# Patient Record
Sex: FEMALE | Race: OTHER | HISPANIC OR LATINO | Employment: FULL TIME | URBAN - METROPOLITAN AREA
[De-identification: names, ages, dates, MRNs, and addresses within clinical notes are randomized per-mention and may not be internally consistent; named-entity substitution may affect disease eponyms.]

---

## 2020-07-20 ENCOUNTER — TELEPHONE (OUTPATIENT)
Dept: FAMILY MEDICINE CLINIC | Facility: CLINIC | Age: 45
End: 2020-07-20

## 2020-07-20 DIAGNOSIS — E03.9 HYPOTHYROIDISM, UNSPECIFIED TYPE: Primary | ICD-10-CM

## 2020-07-20 RX ORDER — LEVOTHYROXINE SODIUM 0.03 MG/1
25 TABLET ORAL DAILY
Qty: 90 TABLET | Refills: 1 | Status: SHIPPED | OUTPATIENT
Start: 2020-07-20 | End: 2020-10-23 | Stop reason: SDUPTHER

## 2020-07-20 RX ORDER — LEVOTHYROXINE SODIUM 0.03 MG/1
25 TABLET ORAL DAILY
COMMUNITY
End: 2020-07-20 | Stop reason: SDUPTHER

## 2020-07-20 NOTE — TELEPHONE ENCOUNTER
Med refill levothyroxine 25 mcg tablet Take 1 tablet every day by oral route  [facility] [address]  [facility]

## 2020-10-20 ENCOUNTER — TELEPHONE (OUTPATIENT)
Dept: FAMILY MEDICINE CLINIC | Facility: CLINIC | Age: 45
End: 2020-10-20

## 2020-10-23 ENCOUNTER — OFFICE VISIT (OUTPATIENT)
Dept: FAMILY MEDICINE CLINIC | Facility: CLINIC | Age: 45
End: 2020-10-23
Payer: COMMERCIAL

## 2020-10-23 VITALS
DIASTOLIC BLOOD PRESSURE: 82 MMHG | HEIGHT: 62 IN | TEMPERATURE: 97.3 F | BODY MASS INDEX: 28.89 KG/M2 | WEIGHT: 157 LBS | SYSTOLIC BLOOD PRESSURE: 126 MMHG | HEART RATE: 84 BPM | OXYGEN SATURATION: 98 %

## 2020-10-23 DIAGNOSIS — E03.9 ACQUIRED HYPOTHYROIDISM: ICD-10-CM

## 2020-10-23 DIAGNOSIS — E03.9 HYPOTHYROIDISM, UNSPECIFIED TYPE: ICD-10-CM

## 2020-10-23 DIAGNOSIS — Z12.31 ENCOUNTER FOR SCREENING MAMMOGRAM FOR MALIGNANT NEOPLASM OF BREAST: Primary | ICD-10-CM

## 2020-10-23 PROCEDURE — 3725F SCREEN DEPRESSION PERFORMED: CPT | Performed by: FAMILY MEDICINE

## 2020-10-23 PROCEDURE — 99213 OFFICE O/P EST LOW 20 MIN: CPT | Performed by: FAMILY MEDICINE

## 2020-10-23 PROCEDURE — 1036F TOBACCO NON-USER: CPT | Performed by: FAMILY MEDICINE

## 2020-10-23 RX ORDER — LEVOTHYROXINE SODIUM 0.03 MG/1
25 TABLET ORAL DAILY
Qty: 90 TABLET | Refills: 1 | Status: SHIPPED | OUTPATIENT
Start: 2020-10-23 | End: 2021-01-11 | Stop reason: SDUPTHER

## 2020-10-27 ENCOUNTER — LAB (OUTPATIENT)
Dept: LAB | Facility: HOSPITAL | Age: 45
End: 2020-10-27
Attending: FAMILY MEDICINE
Payer: COMMERCIAL

## 2020-10-27 DIAGNOSIS — E03.9 HYPOTHYROIDISM, UNSPECIFIED TYPE: ICD-10-CM

## 2020-10-27 LAB — TSH SERPL DL<=0.05 MIU/L-ACNC: 2.65 UIU/ML (ref 0.34–5.6)

## 2020-10-27 PROCEDURE — 84443 ASSAY THYROID STIM HORMONE: CPT

## 2020-10-27 PROCEDURE — 36415 COLL VENOUS BLD VENIPUNCTURE: CPT

## 2020-12-10 ENCOUNTER — TRANSCRIBE ORDERS (OUTPATIENT)
Dept: LAB | Facility: CLINIC | Age: 45
End: 2020-12-10

## 2021-01-11 DIAGNOSIS — E03.9 HYPOTHYROIDISM, UNSPECIFIED TYPE: ICD-10-CM

## 2021-01-11 RX ORDER — LEVOTHYROXINE SODIUM 0.03 MG/1
25 TABLET ORAL DAILY
Qty: 90 TABLET | Refills: 0 | Status: SHIPPED | OUTPATIENT
Start: 2021-01-11 | End: 2021-04-07 | Stop reason: SDUPTHER

## 2021-03-31 DIAGNOSIS — Z23 ENCOUNTER FOR IMMUNIZATION: ICD-10-CM

## 2021-04-07 DIAGNOSIS — E03.9 HYPOTHYROIDISM, UNSPECIFIED TYPE: ICD-10-CM

## 2021-04-07 RX ORDER — LEVOTHYROXINE SODIUM 0.03 MG/1
25 TABLET ORAL DAILY
Qty: 90 TABLET | Refills: 0 | Status: SHIPPED | OUTPATIENT
Start: 2021-04-07 | End: 2021-07-01 | Stop reason: SDUPTHER

## 2021-05-07 ENCOUNTER — IMMUNIZATIONS (OUTPATIENT)
Dept: FAMILY MEDICINE CLINIC | Facility: HOSPITAL | Age: 46
End: 2021-05-07

## 2021-05-07 DIAGNOSIS — Z23 ENCOUNTER FOR IMMUNIZATION: Primary | ICD-10-CM

## 2021-05-07 PROCEDURE — 91301 SARS-COV-2 / COVID-19 MRNA VACCINE (MODERNA) 100 MCG: CPT

## 2021-05-07 PROCEDURE — 0011A SARS-COV-2 / COVID-19 MRNA VACCINE (MODERNA) 100 MCG: CPT

## 2021-06-02 ENCOUNTER — IMMUNIZATIONS (OUTPATIENT)
Dept: FAMILY MEDICINE CLINIC | Facility: HOSPITAL | Age: 46
End: 2021-06-02

## 2021-06-02 DIAGNOSIS — Z23 ENCOUNTER FOR IMMUNIZATION: Primary | ICD-10-CM

## 2021-06-02 PROCEDURE — 91301 SARS-COV-2 / COVID-19 MRNA VACCINE (MODERNA) 100 MCG: CPT

## 2021-06-02 PROCEDURE — 0012A SARS-COV-2 / COVID-19 MRNA VACCINE (MODERNA) 100 MCG: CPT

## 2021-06-28 ENCOUNTER — OFFICE VISIT (OUTPATIENT)
Dept: DERMATOLOGY | Facility: CLINIC | Age: 46
End: 2021-06-28

## 2021-06-28 VITALS — HEIGHT: 63 IN | BODY MASS INDEX: 28.53 KG/M2 | TEMPERATURE: 97.8 F | WEIGHT: 161 LBS

## 2021-06-28 DIAGNOSIS — R21 RASH: Primary | ICD-10-CM

## 2021-06-28 DIAGNOSIS — Z84.89 FAMILY HISTORY OF ATOPY: ICD-10-CM

## 2021-06-28 PROCEDURE — NC001 PR NO CHARGE: Performed by: DERMATOLOGY

## 2021-06-28 RX ORDER — CETIRIZINE HYDROCHLORIDE 10 MG/1
10 TABLET ORAL DAILY
COMMUNITY

## 2021-06-28 NOTE — PROGRESS NOTES
[facility]     Patient Name: Darian Dominguez  Encounter Date: 6/28/2021     Have you been cared for by a [facility] Dermatologist in the last 3 years and, if so, which one? No    · Have you traveled outside of the [address] [place] [address] in the past 3 months or outside of the [facility] area in the last 2 weeks? No     May we call your Preferred Phone number to discuss your specific medical information? Yes     May we leave a detailed message that includes your specific medical information? Yes      Today's Chief Concerns:   Concern #1:  Itchy rash on chest, neck, posterior ears, chin      Past Medical History:  Have you personally ever had or currently have any of the following? · Skin cancer (such as Melanoma, Basal Cell Carcinoma, Squamous Cell Carcinoma? (If Yes, please provide more detail)- No  · Eczema: YES  · Psoriasis: No  · HIV/AIDS: No  · Hepatitis B or C: No  · Tuberculosis: No  · Systemic Immunosuppression such as Diabetes, Biologic or Immunotherapy, Chemotherapy, Organ Transplantation, Bone Marrow Transplantation (If YES, please provide more detail): No  · Radiation Treatment (If YES, please provide more detail): No  · Any other major medical conditions/concerns? 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 Watch TV, reading, and play with son     Family History:  Have any of your "first degree relatives" (parent, brother, sister, or child) had any of the following       · Skin cancer such as Melanoma or Merkel Cell Carcinoma or Pancreatic Cancer? No  · Eczema, Asthma, Hay Fever or Seasonal Allergies: YES, Son has hx of asthma   · Psoriasis or Psoriatic Arthritis: YES, Sister has hx of psoriasis   · Do any other medical conditions seem to run in your family? If Yes, what condition and which relatives?   YES, Diabetes with mother and siblings, Thyroid with siblings and mother     Current Medications:         Current Outpatient Medications:     levothyroxine 25 mcg tablet, Take 1 tablet (25 mcg total) by mouth daily, Disp: 90 tablet, Rfl: 0      Review of Systems:  Have you recently had or currently have any of the following? If YES, what are you doing for the problem? · Fever, chills or unintended weight loss: YES, Chills   · Sudden loss or change in your vision: No  · Nausea, vomiting or blood in your stool: No  · Painful or swollen joints: No  · Wheezing or cough: No  · Changing mole or non-healing wound: No  · Nosebleeds: No  · Excessive sweating: No  · Easy or prolonged bleeding? No  · Over the last 2 weeks, how often have you been bothered by the following problems? · Taking little interest or pleasure in doing things: 1 - Not at All  · Feeling down, depressed, or hopeless: 1 - Not at All  · Rapid heartbeat with epinephrine:  No    · FEMALES ONLY:    · Are you pregnant or planning to become pregnant? No  · Are you currently or planning to be nursing or breast feeding? No    · Any known allergies? Allergies   Allergen Reactions    Pollen Extract Dermatitis, Headache, Itching and Nasal Congestion   ·       Physical Exam:     Was a chaperone (Derm Clinical Assistant) present throughout the entire Physical Exam? Yes     Did the Dermatology Team specifically  the patient on the importance of a Full Skin Exam to be sure that nothing is missed clinically?  Yes}  o Did the patient ultimately request or accept a Full Skin Exam?  NO      CONSTITUTIONAL:   Vitals:    06/28/21 1037   Temp: 97 8 °F (36 6 °C)   TempSrc: Tympanic   Weight: 73 kg (161 lb)   Height: 5' 3" (1 6 m)         PSYCH: Normal mood and affect  EYES: Normal conjunctiva  ENT: Normal lips and oral mucosa  CARDIOVASCULAR: No edema  RESPIRATORY: Normal respirations  HEME/LYMPH/IMMUNO:  No regional lymphadenopathy except as noted below in "ASSESSMENT AND PLAN BY DIAGNOSIS"    SKIN:  FULL ORGAN SYSTEM EXAM    Face Normal except as noted below in Assessment   Neck Normal except as noted below in Assessment       Assessment and Plan by Diagnosis:    History of Present Condition:     Duration:  How long has this been an issue for you?    o  Rash on chest, neck and back of ears about 5 days ago  o Spot on chin area - about 1 1/2 year ago   Location Affected:  Where on the body is this affecting you?    o  Chest, neck, back of ears, chin    Quality:  Is there any bleeding, pain, itch, burning/irritation, or redness associated with the skin lesion? o  Redness, itch   Severity:  Describe any bleeding, pain, itch, burning/irritation, or redness on a scale of 1 to 10 (with 10 being the worst)  o  8   Timing:  Does this condition seem to be there pretty constantly or do you notice it more at specific times throughout the day?    o  Intermittent    Context:  Have you ever noticed that this condition seems to be associated with specific activities you do?    o  Unknown   Modifying Factors:    o Anything that seems to make the condition worse?    -  Unknown   o What have you tried to do to make the condition better? - She tried to exfoliate the area a year ago and it got worse  Pimecrolimus 1% cream - Chin area- used it for 3 days, without improvement  - She has tried Vanicream moisturizer   - Tried Eucerin Urea repair cream  -  Tried over the counter ( Bought in [employer]) Zatural handcrafterf NEEMZ cleanser bar       Associated Signs and Symptoms:  Does this skin lesion seem to be associated with any of the following:  o  SL AMB DERM SIGNS AND SYMPTOMS: Itching and Scratching     1  RASH (Likely hypersensitivity reaction)    Physical Exam:   Anatomic Location Affected:  Chest, right ear   Morphological Description:  Faint erythema   Pertinent Positives: Patient reports the locations are itchy   Pertinent Negatives:     Additional History of Present Condition:  Patient complains of a red, itchy rash specially at night  She says this rash started about 5 days ago with an unknown cause  She has history of metal allergies diagnosed by her [address]   She takes Zyrtec everyday  She uses Pimecrolimus 1% cream as needed for the rash  She feels the Pimecrolimus cream calms the itch  She tried over the counter Vanicream and Eucerin Urea cream      Assessment and Plan:  Based on a thorough discussion of this condition and the management approach to it (including a comprehensive discussion of the known risks, side effects and potential benefits of treatment), the patient (family) agrees to implement the following specific plan:   Recommended to take a picture when she flares up ( red and itchy)  Call the office and schedule an appointment as soon the rash starts to get active   Continue taking Zyrtec 10 mg daily   Recommended to use Pimecrolimus 1% cream to chest and right ear twice a day to calm the itch  Discussed this cream is safe to use   We will check next visit to see how this is progressing  2  RASH (Morpheaform)  Physical Exam:   Anatomic Location Affected:  Left chin and left jawline   Morphological Description:  Stellate, reticulated erythema with slight atrophy   Pertinent Positives:   Pertinent Negatives: No history of topical steroid use to the area    Additional History of Present Condition:  Patient reports that she has never applied topical steroid to the area   Has only used pimecrolimus ointment and eucerin cream    Assessment and Plan:  Based on a thorough discussion of this condition and the management approach to it (including a comprehensive discussion of the known risks, side effects and potential benefits of treatment), the patient (family) agrees to implement the following specific plan:   Plan is to biopsy the lesion to confirm diagnosis   Discontinue pimecrolimus ointment prior to biopsy to optimize results   Can continue using Eucerin cream to the area in the meantime    Scribe Attestation    I,:  Malini Oliveira am acting as a scribe while in the presence of the attending physician :       I,:  Garrick Francois MD personally performed the services described in this documentation    as scribed in my presence :

## 2021-07-07 ENCOUNTER — OFFICE VISIT (OUTPATIENT)
Dept: DERMATOLOGY | Age: 46
End: 2021-07-07
Payer: COMMERCIAL

## 2021-07-07 VITALS — TEMPERATURE: 99.1 F | HEIGHT: 63 IN | WEIGHT: 162 LBS | BODY MASS INDEX: 28.7 KG/M2

## 2021-07-07 DIAGNOSIS — D48.5 NEOPLASM OF UNCERTAIN BEHAVIOR OF SKIN: ICD-10-CM

## 2021-07-07 DIAGNOSIS — R21 RASH: Primary | ICD-10-CM

## 2021-07-07 PROCEDURE — 88312 SPECIAL STAINS GROUP 1: CPT | Performed by: PATHOLOGY

## 2021-07-07 PROCEDURE — 11104 PUNCH BX SKIN SINGLE LESION: CPT | Performed by: DERMATOLOGY

## 2021-07-07 PROCEDURE — 88305 TISSUE EXAM BY PATHOLOGIST: CPT | Performed by: PATHOLOGY

## 2021-07-07 PROCEDURE — 99213 OFFICE O/P EST LOW 20 MIN: CPT | Performed by: DERMATOLOGY

## 2021-07-07 PROCEDURE — 88313 SPECIAL STAINS GROUP 2: CPT | Performed by: PATHOLOGY

## 2021-07-07 NOTE — PATIENT INSTRUCTIONS
RASH (Likely hypersensitivity reaction)    PROCEDURE NOTE:  PUNCH BIOPSY      Performing Physician: Dr Ybarra     Indications: To indicate diagnosis and management plan  Complications:  None      Specimen has been sent for review by Dermatopathology  Plan:  1  Instructed to keep the wound dry and covered for 24-48h and clean thereafter  2  Warning signs of infection were reviewed  3  Recommended that the patient use acetaminophen as needed for pain        Standard post-procedure care has been explained and has been included in written form within the patient's copy of Informed Consent

## 2021-07-07 NOTE — PROGRESS NOTES
[facility] Follow Up Note    Patient Name: Norma Gonzalez  Encounter Date: 07/07/2021    Today's Chief Concerns:  Elen Fritz Concern #1:  Follow up for rash       Current Medications:    Current Outpatient Medications:     cetirizine (ZyrTEC) 10 mg tablet, Take 10 mg by mouth daily, Disp: , Rfl:     levothyroxine 25 mcg tablet, Take 1 tablet (25 mcg total) by mouth daily, Disp: 90 tablet, Rfl: 0    CONSTITUTIONAL:   Vitals:    07/07/21 1235   Temp: 99 1 °F (37 3 °C)   TempSrc: Tympanic   Weight: 73 5 kg (162 lb)   Height: 5' 3" (1 6 m)     Specific Alerts:    Have you been seen by a [facility] Dermatologist in the last 3 years? YES    Are you pregnant or planning to become pregnant? No    Are you currently or planning to be nursing or breast feeding? No    Allergies   Allergen Reactions    Pollen Extract Dermatitis, Headache, Itching and Nasal Congestion       May we call your Preferred Phone number to discuss your specific medical information? YES    May we leave a detailed message that includes your specific medical information? YES    Have you traveled outside of the [facility] in the past 3 months? No    Do you currently have a pacemaker or defibrillator? No    Do you have any artificial heart valves, joints, plates, screws, rods, stents, pins, etc? No   - If Yes, were any placed within the last 2 years? Do you require any medications prior to a surgical procedure? No     Are you taking any medications that cause you to bleed more easily ("blood thinners") No    Have you ever experienced a rapid heartbeat with epinephrine? No    Have you ever been treated with "gold" (gold sodium thiomalate) therapy? No    [facility] can help with wrinkles, "laugh lines," facial volume loss, "double chin," "love handles," age spots, and more  Are you interested in learning today about some of the skin enhancement procedures that we offer?  (If Yes, please provide more detail) No    Review of Systems:  Have you recently had or currently have any of the following? · Fever or chills: No  · Night Sweats: No  · Headaches: No  · Weight Gain: No  · Weight Loss: No  · Blurry Vision: No  · Nausea: No  · Vomiting: No  · Diarrhea: No  · Blood in Stool: No  · Abdominal Pain: No  · Itchy Skin: YES  · Painful Joints: No  · Swollen Joints: No  · Muscle Pain: No  · Irregular Mole: No  · Sun Burn: YES  · Dry Skin: No  · Skin Color Changes: No  · Scar or Keloid: No  · Cold Sores/Fever Blisters: No  · Bacterial Infections/MRSA: No  · Anxiety: No  · Depression: No  · Suicidal or Homicidal Thoughts: No      PSYCH: Normal mood and affect  EYES: Normal conjunctiva  ENT: Normal lips and oral mucosa  CARDIOVASCULAR: No edema  RESPIRATORY: Normal respirations  HEME/LYMPH/IMMUNO:  No regional lymphadenopathy except as noted below in ASSESSMENT AND PLAN BY DIAGNOSIS    FULL ORGAN SYSTEM SKIN EXAM (SKIN)   Hair, Scalp, Ears, Face Normal except as noted below in Assessment   Neck, Cervical Chain Nodes Normal except as noted below in Assessment   Right Arm/Hand/Fingers Normal except as noted below in Assessment   Left Arm/Hand/Fingers Normal except as noted below in Assessment   Chest/Breasts/Axillae Viewed areas Normal except as noted below in Assessment   Abdomen, Umbilicus Normal except as noted below in Assessment   Back/Spine Normal except as noted below in Assessment   Groin/Genitalia/Buttocks Viewed areas Normal except as noted below in Assessment   Right Leg, Foot, Toes Normal except as noted below in Assessment   Left Leg, Foot, Toes Normal except as noted below in Assessment          RASH (Likely hypersensitivity reaction)     Physical Exam:  · Anatomic Location Affected:  Chest, right ear  · Morphological Description:  Faint erythema  · Pertinent Positives: Patient reports the locations are itchy  · Pertinent Negatives:     Additional History of Present Condition:  Patient reports that the rash is gone but still has the sensation of itching  Patient is currently using Zyrtec and Pimecrolimus cream       Assessment and Plan:  Based on a thorough discussion of this condition and the management approach to it (including a comprehensive discussion of the known risks, side effects and potential benefits of treatment), the patient (family) agrees to implement the following specific plan:  ·  Continue the Pimecrolimus cream as needed for itching         2  RASH (Morpheaform)    Physical Exam:  · Anatomic Location Affected:  Left chin and left jawline  · Morphological Description:  Stellate, reticulated erythema with slight atrophy  · Pertinent Positives:  · Pertinent Negatives: No history of topical steroid use to the area     Additional History of Present Condition:  Patient reports no improvement in rash, started as a small area and spread  Denies use of steroids on area     Assessment and Plan:  Based on a thorough discussion of this condition and the management approach to it (including a comprehensive discussion of the known risks, side effects and potential benefits of treatment), the patient (family) agrees to implement the following specific plan:  · Biopsy taken today in office and will call with results       PROCEDURE NOTE:  PUNCH BIOPSY      Performing Physician: Dr Ybarra    Anatomic Location; Clinical Description with size (cm); Pre-Op Diagnosis:     Right jaw line; 6 cm; atrophic pink plaque with reticulate purple macule; differential diagnosis; morphea rule out lipodystrophy unlikely steroid atrophy       Anesthesia: 1% xylocaine with epi       Topical anesthesia: None       Indications: To indicate diagnosis and management plan  Procedure Details     Patient informed of the risks (including bleeding,scaring and infection) and benefits of the procedure explained  Verbal and written informed consent obtained  The area was prepped and draped in the usual fashion   Anesthesia was obtained with 1% lidocaine with epinephrine  The skin was then stretched perpendicular to the skin tension lines and a punch biopsy to an appropriate sampling depth was obtained with a 4 mm punch with a forceps and iris scissors  Hemostasis was obtained with 5-0 Vicryl  x 2 sutures  Complications:  None      Specimen has been sent for review by Dermatopathology  Plan:  1  Instructed to keep the wound dry and covered for 24-48h and clean thereafter  2  Warning signs of infection were reviewed  3  Recommended that the patient use acetaminophen as needed for pain        Standard post-procedure care has been explained and has been included in written form within the patient's copy of Informed Consent            Scribe Attestation    I,:  Reyes Miser am acting as a scribe while in the presence of the attending physician :       I,:  Joyce Little MD personally performed the services described in this documentation    as scribed in my presence :

## 2021-08-05 DIAGNOSIS — M35.9 CONNECTIVE TISSUE DISEASE (HCC): Primary | ICD-10-CM

## 2021-08-11 ENCOUNTER — APPOINTMENT (OUTPATIENT)
Dept: LAB | Facility: CLINIC | Age: 46
End: 2021-08-11
Payer: COMMERCIAL

## 2021-08-11 DIAGNOSIS — M35.9 CONNECTIVE TISSUE DISEASE (HCC): ICD-10-CM

## 2021-08-11 LAB
ALBUMIN SERPL BCP-MCNC: 3.8 G/DL (ref 3.5–5)
ALP SERPL-CCNC: 96 U/L (ref 46–116)
ALT SERPL W P-5'-P-CCNC: 29 U/L (ref 12–78)
ANION GAP SERPL CALCULATED.3IONS-SCNC: 4 MMOL/L (ref 4–13)
AST SERPL W P-5'-P-CCNC: 27 U/L (ref 5–45)
BASOPHILS # BLD AUTO: 0.07 THOUSANDS/ΜL (ref 0–0.1)
BASOPHILS NFR BLD AUTO: 1 % (ref 0–1)
BILIRUB SERPL-MCNC: 0.57 MG/DL (ref 0.2–1)
BILIRUB UR QL STRIP: NEGATIVE
BUN SERPL-MCNC: 11 MG/DL (ref 5–25)
CALCIUM SERPL-MCNC: 9.1 MG/DL (ref 8.3–10.1)
CHLORIDE SERPL-SCNC: 107 MMOL/L (ref 100–108)
CLARITY UR: CLEAR
CO2 SERPL-SCNC: 25 MMOL/L (ref 21–32)
COLOR UR: YELLOW
CREAT SERPL-MCNC: 0.88 MG/DL (ref 0.6–1.3)
EOSINOPHIL # BLD AUTO: 0.11 THOUSAND/ΜL (ref 0–0.61)
EOSINOPHIL NFR BLD AUTO: 2 % (ref 0–6)
ERYTHROCYTE [DISTWIDTH] IN BLOOD BY AUTOMATED COUNT: 15.3 % (ref 11.6–15.1)
GFR SERPL CREATININE-BSD FRML MDRD: 78 ML/MIN/1.73SQ M
GLUCOSE P FAST SERPL-MCNC: 93 MG/DL (ref 65–99)
GLUCOSE UR STRIP-MCNC: NEGATIVE MG/DL
HCT VFR BLD AUTO: 46.3 % (ref 34.8–46.1)
HGB BLD-MCNC: 14.6 G/DL (ref 11.5–15.4)
HGB UR QL STRIP.AUTO: NEGATIVE
IMM GRANULOCYTES # BLD AUTO: 0.01 THOUSAND/UL (ref 0–0.2)
IMM GRANULOCYTES NFR BLD AUTO: 0 % (ref 0–2)
KETONES UR STRIP-MCNC: NEGATIVE MG/DL
LEUKOCYTE ESTERASE UR QL STRIP: NEGATIVE
LYMPHOCYTES # BLD AUTO: 2.63 THOUSANDS/ΜL (ref 0.6–4.47)
LYMPHOCYTES NFR BLD AUTO: 39 % (ref 14–44)
MCH RBC QN AUTO: 26.7 PG (ref 26.8–34.3)
MCHC RBC AUTO-ENTMCNC: 31.5 G/DL (ref 31.4–37.4)
MCV RBC AUTO: 85 FL (ref 82–98)
MONOCYTES # BLD AUTO: 0.6 THOUSAND/ΜL (ref 0.17–1.22)
MONOCYTES NFR BLD AUTO: 9 % (ref 4–12)
NEUTROPHILS # BLD AUTO: 3.42 THOUSANDS/ΜL (ref 1.85–7.62)
NEUTS SEG NFR BLD AUTO: 49 % (ref 43–75)
NITRITE UR QL STRIP: NEGATIVE
NRBC BLD AUTO-RTO: 0 /100 WBCS
PH UR STRIP.AUTO: 6 [PH]
PLATELET # BLD AUTO: 228 THOUSANDS/UL (ref 149–390)
PMV BLD AUTO: 12.3 FL (ref 8.9–12.7)
POTASSIUM SERPL-SCNC: 3.7 MMOL/L (ref 3.5–5.3)
PROT SERPL-MCNC: 8.5 G/DL (ref 6.4–8.2)
PROT UR STRIP-MCNC: NEGATIVE MG/DL
RBC # BLD AUTO: 5.46 MILLION/UL (ref 3.81–5.12)
SODIUM SERPL-SCNC: 136 MMOL/L (ref 136–145)
SP GR UR STRIP.AUTO: 1.02 (ref 1–1.03)
UROBILINOGEN UR QL STRIP.AUTO: 0.2 E.U./DL
WBC # BLD AUTO: 6.84 THOUSAND/UL (ref 4.31–10.16)

## 2021-08-11 PROCEDURE — 85025 COMPLETE CBC W/AUTO DIFF WBC: CPT

## 2021-08-11 PROCEDURE — 36415 COLL VENOUS BLD VENIPUNCTURE: CPT

## 2021-08-11 PROCEDURE — 81003 URINALYSIS AUTO W/O SCOPE: CPT | Performed by: DERMATOLOGY

## 2021-08-11 PROCEDURE — 80053 COMPREHEN METABOLIC PANEL: CPT

## 2021-08-11 PROCEDURE — 86225 DNA ANTIBODY NATIVE: CPT

## 2021-08-11 PROCEDURE — 86038 ANTINUCLEAR ANTIBODIES: CPT

## 2021-08-13 LAB — RYE IGE QN: NEGATIVE

## 2021-08-17 LAB — MISCELLANEOUS LAB TEST RESULT: NORMAL

## 2021-08-25 ENCOUNTER — OFFICE VISIT (OUTPATIENT)
Dept: DERMATOLOGY | Age: 46
End: 2021-08-25
Payer: COMMERCIAL

## 2021-08-25 VITALS — BODY MASS INDEX: 28 KG/M2 | TEMPERATURE: 97.8 F | HEIGHT: 63 IN | WEIGHT: 158 LBS

## 2021-08-25 DIAGNOSIS — L23.9 ALLERGIC CONTACT DERMATITIS, UNSPECIFIED TRIGGER: ICD-10-CM

## 2021-08-25 DIAGNOSIS — L93.0 DISCOID LUPUS: Primary | ICD-10-CM

## 2021-08-25 PROCEDURE — 99213 OFFICE O/P EST LOW 20 MIN: CPT | Performed by: DERMATOLOGY

## 2021-08-25 RX ORDER — HYDROXYCHLOROQUINE SULFATE 200 MG/1
100 TABLET, FILM COATED ORAL
Qty: 15 TABLET | Refills: 2 | Status: SHIPPED | OUTPATIENT
Start: 2021-08-25 | End: 2021-09-27 | Stop reason: SDUPTHER

## 2021-08-25 NOTE — PROGRESS NOTES
[facility] Follow Up Note    Patient Name: Clemencia Moody  Encounter Date: 08/25/21      Today's Chief Concerns:  [employer] Concern #1:  Follow up       Current Medications:    Current Outpatient Medications:     cetirizine (ZyrTEC) 10 mg tablet, Take 10 mg by mouth daily, Disp: , Rfl:     levothyroxine 25 mcg tablet, Take 1 tablet (25 mcg total) by mouth daily, Disp: 90 tablet, Rfl: 0    CONSTITUTIONAL:   There were no vitals filed for this visit  Specific Alerts:    Have you been seen by a [facility]atologist in the last 3 years? YES    Are you pregnant or planning to become pregnant? N/A    Are you currently or planning to be nursing or breast feeding? N/A    Allergies   Allergen Reactions    Pollen Extract Dermatitis, Headache, Itching and Nasal Congestion       May we call your Preferred Phone number to discuss your specific medical information? YES    May we leave a detailed message that includes your specific medical information? YES    Have you traveled outside of the [facility] in the past 3 months? No    Do you currently have a pacemaker or defibrillator? No    Do you have any artificial heart valves, joints, plates, screws, rods, stents, pins, etc? No    Do you require any medications prior to a surgical procedure? No    Are you taking any medications that cause you to bleed more easily ("blood thinners") No    Have you ever experienced a rapid heartbeat with epinephrine? No      Review of Systems:  Have you recently had or currently have any of the following?     · Fever or chills: No  · Night Sweats: No  · Headaches: No  · Weight Gain: No  · Weight Loss: No  · Blurry Vision: No  · Nausea: No  · Vomiting: No  · Diarrhea: No  · Blood in Stool: No  · Abdominal Pain: No  · Itchy Skin: YES  · Painful Joints: No  · Swollen Joints: No  · Muscle Pain: No  · Irregular Mole: No  · Sun Burn: No  · Dry Skin: YES  · Skin Color Changes: No  · Scar or Keloid: No  · Cold Sores/Fever Blisters: YES  · Bacterial Infections/MRSA: No  · Anxiety: No  · Depression: No  · Suicidal or Homicidal Thoughts: No      PSYCH: Normal mood and affect  EYES: Normal conjunctiva  ENT: Normal lips and oral mucosa  CARDIOVASCULAR: No edema  RESPIRATORY: Normal respirations  HEME/LYMPH/IMMUNO:  No regional lymphadenopathy except as noted below in ASSESSMENT AND PLAN BY DIAGNOSIS    FULL ORGAN SYSTEM SKIN EXAM (SKIN)   Hair, Scalp, Ears, Face Normal except as noted below in Assessment   Neck, Cervical Chain Nodes Normal except as noted below in Assessment   Right Arm/Hand/Fingers Normal except as noted below in Assessment   Left Arm/Hand/Fingers Normal except as noted below in Assessment   Chest/Breasts/Axillae Viewed areas Normal except as noted below in Assessment   Abdomen, Umbilicus Normal except as noted below in Assessment   Back/Spine Normal except as noted below in Assessment   Groin/Genitalia/Buttocks Viewed areas Normal except as noted below in Assessment   Right Leg, Foot, Toes Normal except as noted below in Assessment   Left Leg, Foot, Toes Normal except as noted below in Assessment       Discoid Lupus and Contact Dermatitis    Physical Exam:   (Anatomic Location); (Size and Morphological Description); (Differential Diagnosis):  o Right chin; atrophic pink patch with telangectasia   o bilateral face; urticarial red plaques   Pertinent Positives:   Pertinent Negatives: Additional History of Present Condition:  Patient is present to follow up lab results and punch biopsy        Assessment and Plan: patient develops contact dermatitis from sun screen, will have her use hats and heliocare until patch testing can be done; discoid lupus R jaw has improved with application of aloe, will add plaquenil 100 daily (note:  Does not smoke and counseled not to start); currently with dermal hypersensitivity reaction on face separate from DLE R jaw - she can continue elidel on these and again, recommend patch testing  Based on a thorough discussion of this condition and the management approach to it (including a comprehensive discussion of the known risks, side effects and potential benefits of treatment), the patient (family) agrees to implement the following specific plan:   Discuss Patch testing due to allergic reaction to multiple over the counter sunscreen; will contact insurance for approval; once approved we will contact you to schedule      Continue to use at home treatment with Aloe Vera and Oatmeal products since they are helping   Start Hydroxychlorquine 100 mg daily    When outside we recommend using a wide brim hat, sunglasses, long sleeve and pants,  or SPF specific clothing    Start taking over the counter Alistair-Care  o   o Follow up 1 month       Scribe Attestation    I,:  Juan Manuel Chaparro MA am acting as a scribe while in the presence of the attending physician :       I,:  Florencia Collado MD personally performed the services described in this documentation    as scribed in my presence :

## 2021-08-25 NOTE — PATIENT INSTRUCTIONS
Assessment and Plan:  Based on a thorough discussion of this condition and the management approach to it (including a comprehensive discussion of the known risks, side effects and potential benefits of treatment), the patient (family) agrees to implement the following specific plan:   Discuss Patch testing due to allergic reaction to multiple over the counter sunscreen; will contact insurance for approval; once approved we will contact you to schedule      Continue to use at home treatment with Aloe Vera and Oatmeal products since they are helping   Start Hydroxychlorquine 100 mg daily    When outside we recommend using a wide brim hat, sunglasses, long sleeve and pants,  or SPF specific clothing    Start taking over the counter Alistair-Care  o Follow up 1 month

## 2021-08-27 ENCOUNTER — OFFICE VISIT (OUTPATIENT)
Dept: FAMILY MEDICINE CLINIC | Facility: CLINIC | Age: 46
End: 2021-08-27
Payer: COMMERCIAL

## 2021-08-27 VITALS
DIASTOLIC BLOOD PRESSURE: 80 MMHG | SYSTOLIC BLOOD PRESSURE: 124 MMHG | OXYGEN SATURATION: 99 % | WEIGHT: 157 LBS | HEIGHT: 63 IN | TEMPERATURE: 97.2 F | HEART RATE: 68 BPM | BODY MASS INDEX: 27.82 KG/M2 | RESPIRATION RATE: 16 BRPM

## 2021-08-27 DIAGNOSIS — M35.9 CONNECTIVE TISSUE DISEASE (HCC): ICD-10-CM

## 2021-08-27 DIAGNOSIS — Z11.59 NEED FOR HEPATITIS C SCREENING TEST: ICD-10-CM

## 2021-08-27 DIAGNOSIS — E03.9 ACQUIRED HYPOTHYROIDISM: ICD-10-CM

## 2021-08-27 DIAGNOSIS — Z12.31 BREAST CANCER SCREENING BY MAMMOGRAM: ICD-10-CM

## 2021-08-27 DIAGNOSIS — Z00.00 ANNUAL PHYSICAL EXAM: Primary | ICD-10-CM

## 2021-08-27 PROCEDURE — 3008F BODY MASS INDEX DOCD: CPT | Performed by: FAMILY MEDICINE

## 2021-08-27 PROCEDURE — 99396 PREV VISIT EST AGE 40-64: CPT | Performed by: FAMILY MEDICINE

## 2021-08-27 PROCEDURE — 1036F TOBACCO NON-USER: CPT | Performed by: FAMILY MEDICINE

## 2021-08-27 NOTE — PROGRESS NOTES
BMI Counseling: Body mass index is 27 81 kg/m²  The BMI is above normal  Nutrition recommendations include 3-5 servings of fruits/vegetables daily, reducing fast food intake, consuming healthier snacks, decreasing soda and/or juice intake, moderation in carbohydrate intake and increasing intake of lean protein  Exercise recommendations include exercising 3-5 times per week and strength training exercises  Assessment/Plan:         Problem List Items Addressed This Visit        Endocrine    Acquired hypothyroidism     Check TSH         Relevant Orders    TSH, 3rd generation       Other    Connective tissue disease ([address] )     Discoid lupus           Other Visit Diagnoses     Annual physical exam    -  Primary    Relevant Orders    Lipid panel    Need for hepatitis C screening test        Relevant Orders    Hepatitis C Antibody ([facility])    Breast cancer screening by mammogram        Relevant Orders    Mammo screening bilateral w 3d & cad            Subjective: pt here for annual physical does have gyn will make appt seeing derm now with discoid lupus  Pt has hypothyroidism     Patient ID: Keyla Dutta is a 48 y o  female  HPI    The following portions of the patient's history were reviewed and updated as appropriate:   Past Medical History:  She has a past medical history of Allergic (Pollen), Disease of thyroid gland (Hipotiroidismo), Eczema, and Seasonal allergies  ,  _______________________________________________________________________  Medical Problems:  does not have any pertinent problems on file ,  _______________________________________________________________________  Past Surgical History:   has a past surgical history that includes Mammo (historical) (2019)  ,  _______________________________________________________________________  Family History:  family history includes Diabetes in her brother, maternal uncle, and mother; Heart disease in her mother; Hypertension in her maternal aunt, maternal uncle, and mother; Thyroid disease in her mother and sister  ,  _______________________________________________________________________  Social History:   reports that she has never smoked  She has never used smokeless tobacco  She reports previous alcohol use  She reports that she does not use drugs  ,  _______________________________________________________________________  Allergies:  is allergic to pollen extract     _______________________________________________________________________  Current Outpatient Medications   Medication Sig Dispense Refill    cetirizine (ZyrTEC) 10 mg tablet Take 10 mg by mouth daily      hydroxychloroquine (PLAQUENIL) 200 mg tablet Take 0 5 tablets (100 mg total) by mouth daily with breakfast 15 tablet 2    levothyroxine 25 mcg tablet Take 1 tablet (25 mcg total) by mouth daily 90 tablet 0     No current facility-administered medications for this visit      _______________________________________________________________________  Review of Systems   Constitutional: Negative for appetite change, chills, fatigue and fever  Respiratory: Negative for cough, chest tightness and shortness of breath  Cardiovascular: Negative for chest pain, palpitations and leg swelling  Gastrointestinal: Negative for abdominal pain, constipation, diarrhea, nausea and vomiting  Genitourinary: Negative for difficulty urinating and frequency  Musculoskeletal: Negative for arthralgias, back pain and neck pain  Skin: Negative for rash  Neurological: Negative for dizziness, weakness, light-headedness, numbness and headaches  Hematological: Does not bruise/bleed easily  Psychiatric/Behavioral: Negative for dysphoric mood and sleep disturbance  The patient is not nervous/anxious            Objective:  Vitals:    08/27/21 1308   BP: 124/80   BP Location: Left arm   Patient Position: Sitting   Pulse: 68   Resp: 16   Temp: (!) 97 2 °F (36 2 °C)   SpO2: 99%   Weight: 71 2 kg (157 lb) Height: 5' 3" (1 6 m)     Body mass index is 27 81 kg/m²  Physical Exam  Vitals reviewed  Constitutional:       General: She is not in acute distress  Appearance: Normal appearance  She is well-developed  HENT:      Head: Normocephalic  Right Ear: Tympanic membrane, ear canal and external ear normal       Left Ear: Tympanic membrane, ear canal and external ear normal       Nose: Nose normal       Mouth/Throat:      Pharynx: No oropharyngeal exudate  Eyes:      General: Lids are normal       Extraocular Movements: Extraocular movements intact  Conjunctiva/sclera: Conjunctivae normal       Pupils: Pupils are equal, round, and reactive to light  Neck:      Thyroid: No thyromegaly  Vascular: No carotid bruit  Cardiovascular:      Rate and Rhythm: Normal rate and regular rhythm  Pulses: Normal pulses  Heart sounds: Normal heart sounds  No murmur heard  No friction rub  Pulmonary:      Effort: Pulmonary effort is normal  No respiratory distress  Breath sounds: Normal breath sounds  No stridor  No wheezing or rales  Chest:      Breasts: Breasts are symmetrical          Right: Normal  No swelling, bleeding, inverted nipple, mass, nipple discharge, skin change or tenderness  Left: Normal  No swelling, bleeding, inverted nipple, mass, nipple discharge, skin change or tenderness  Abdominal:      General: Bowel sounds are normal  There is no distension  Palpations: Abdomen is soft  There is no mass  Tenderness: There is no abdominal tenderness  There is no guarding  Hernia: No hernia is present  Musculoskeletal:         General: Normal range of motion  Cervical back: Full passive range of motion without pain, normal range of motion and neck supple  Lymphadenopathy:      Cervical: No cervical adenopathy  Skin:     General: Skin is warm and dry  Findings: No rash        Comments: Nl appearing moles:hyperpigmented patch right chin Neurological:      General: No focal deficit present  Mental Status: She is alert and oriented to person, place, and time  Mental status is at baseline  Cranial Nerves: No cranial nerve deficit  Sensory: No sensory deficit  Motor: No abnormal muscle tone  Coordination: Coordination normal       Gait: Gait normal       Deep Tendon Reflexes: Reflexes normal  Babinski sign absent on the right side  Psychiatric:         Mood and Affect: Mood normal          Speech: Speech normal          Behavior: Behavior normal          Thought Content:  Thought content normal          Judgment: Judgment normal

## 2021-08-31 ENCOUNTER — APPOINTMENT (OUTPATIENT)
Dept: LAB | Facility: CLINIC | Age: 46
End: 2021-08-31
Payer: COMMERCIAL

## 2021-08-31 DIAGNOSIS — Z00.00 ANNUAL PHYSICAL EXAM: ICD-10-CM

## 2021-08-31 DIAGNOSIS — E03.9 ACQUIRED HYPOTHYROIDISM: ICD-10-CM

## 2021-08-31 DIAGNOSIS — Z11.59 NEED FOR HEPATITIS C SCREENING TEST: ICD-10-CM

## 2021-08-31 LAB
CHOLEST SERPL-MCNC: 265 MG/DL (ref 50–200)
HCV AB SER QL: NORMAL
HDLC SERPL-MCNC: 40 MG/DL
LDLC SERPL CALC-MCNC: 180 MG/DL (ref 0–100)
NONHDLC SERPL-MCNC: 225 MG/DL
TRIGL SERPL-MCNC: 223 MG/DL
TSH SERPL DL<=0.05 MIU/L-ACNC: 4.15 UIU/ML (ref 0.36–3.74)

## 2021-08-31 PROCEDURE — 86803 HEPATITIS C AB TEST: CPT

## 2021-08-31 PROCEDURE — 84443 ASSAY THYROID STIM HORMONE: CPT

## 2021-08-31 PROCEDURE — 36415 COLL VENOUS BLD VENIPUNCTURE: CPT

## 2021-08-31 PROCEDURE — 80061 LIPID PANEL: CPT

## 2021-09-09 DIAGNOSIS — E03.9 ACQUIRED HYPOTHYROIDISM: Primary | ICD-10-CM

## 2021-09-09 RX ORDER — LEVOTHYROXINE SODIUM 0.05 MG/1
50 TABLET ORAL DAILY
Qty: 30 TABLET | Refills: 6 | Status: SHIPPED | OUTPATIENT
Start: 2021-09-09 | End: 2021-12-10 | Stop reason: SDUPTHER

## 2021-09-09 RX ORDER — LEVOTHYROXINE SODIUM 0.05 MG/1
50 TABLET ORAL DAILY
COMMUNITY
End: 2021-09-09 | Stop reason: SDUPTHER

## 2021-09-10 ENCOUNTER — TELEPHONE (OUTPATIENT)
Dept: DERMATOLOGY | Facility: CLINIC | Age: 46
End: 2021-09-10

## 2021-09-10 NOTE — TELEPHONE ENCOUNTER
Pt's  called in regards to the status of patch testing prior auth  He called the insurance and they told him they had not received the form from our office  He put the insurance on a three way call with me and the insurance asked for our fax number to send us the form  The pt would like a call back with the status of the patch testing

## 2021-09-10 NOTE — TELEPHONE ENCOUNTER
Called patient left her a vm in Bulgarian making her aware that patches have been approved; and that her patch testing apt have been scheduled  Provided patient with dates and times and location  It patient calls back please make her aware       Thank you     Auth Reference number [id number]

## 2021-09-21 ENCOUNTER — OFFICE VISIT (OUTPATIENT)
Dept: DERMATOLOGY | Age: 46
End: 2021-09-21
Payer: COMMERCIAL

## 2021-09-21 VITALS — BODY MASS INDEX: 28 KG/M2 | TEMPERATURE: 97.9 F | HEIGHT: 63 IN | WEIGHT: 158 LBS

## 2021-09-21 DIAGNOSIS — L93.0 DISCOID LUPUS: ICD-10-CM

## 2021-09-21 DIAGNOSIS — L23.9 ALLERGIC CONTACT DERMATITIS, UNSPECIFIED TRIGGER: Primary | ICD-10-CM

## 2021-09-21 PROCEDURE — 95044 PATCH/APPLICATION TESTS: CPT | Performed by: DERMATOLOGY

## 2021-09-21 NOTE — PATIENT INSTRUCTIONS
PATCH TEST DAY 1    Assessment and Plan:  ? Area of body affected: Right chin; atrophic pink patch with telangectasia    bilateral face; urticarial red plaques   Prior treatment: Hydroxychlorquine 100 mg daily, zyrtec 10 mg and Heliocare    Indication for patch test:  Discoid Lupus and Contact Dermatitis    Plan is to patch test using T R U E Test   Allergic contact dermatitis patch testing was explained to the patient  The patient understands that this testing is only a test, not a treatment  Therefore, avoidance of any allergen is the key to improvement of the eruption if an allergy is found  Additionally, the patient understands that there is a possibility of a negative test as there are over 4,300 known allergens and it is impossible to test for everything so we will test for the more common causes that can cause this pattern of pruritis   Recommended no showering, sweating or excessive moisture as this reduces the effectiveness of the test   Also, no oral steroids and do not apply topical steroids to the testing field   The patient understands that they must come to the clinic on Monday to have the patches placed, Wednesday to have the patches removed and an initial read performed,  and Friday of the testing week for the final reading  PROCEDURE: PATCH TEST APPLICATION    Total number of patches applied: 36 individual patches    The first panel was placed on the upper left side of patient's back with 1 3 marked in the upper left corner  The entire patch was smoothed, making sure each chamber made adequate contact with the skin  Grid 1 3 was placed over applied panel, a surgical marker was used to appropriately laura notches on skin  Hypafix was applied over patches  This was repeated for patches 1 2 and 1 3  PATIENT INSTRUCTIONS     After your patch tests are applied, you will need to return in two days (48 hours) to have your patch-test sites read   The appointments should have been made at the time your initial appointment was scheduled  Please do not take a bath or get your back wet until after you have completed all your patch test visits  Do not get your back wet between the time the patch tests are removed and the time of your final visit  Please do not take part in any strenuous activities that will loosen the tape on your back or cause you to perspire heavily  If the tape becomes loose, it may be reinforced with a medical tape from your home  If one or more patches hurt or become very itchy (more so than the others), they may be cut out and brought in separately  Ordinarily, this is not necessary  Leave the patches alone if you feel a generalized itch from the tape and cannot pinpoint exactly which patch is causing the discomfort  You may take an antihistamine for the itching, such as Benadryl  You may want to wear an OLD undershirt to prevent the adhesives and/or patch test substances from sticking to or staining your clothes during and after the patch tests are removed  Please call the patch test nurse at: [phone] to report any reactions occurring after your final patch test appointment

## 2021-09-21 NOTE — PROGRESS NOTES
[facility] Follow Up Note    Patient Name: Vasquez Bosch  Encounter Date: 09/21/2021    Today's Chief Concerns:  Kenny Thrasher Concern #1:  Allergy patch test day 1    Current Medications:    Current Outpatient Medications:     cetirizine (ZyrTEC) 10 mg tablet, Take 10 mg by mouth daily, Disp: , Rfl:     hydroxychloroquine (PLAQUENIL) 200 mg tablet, Take 0 5 tablets (100 mg total) by mouth daily with breakfast, Disp: 15 tablet, Rfl: 2    levothyroxine 50 mcg tablet, Take 1 tablet (50 mcg total) by mouth daily, Disp: 30 tablet, Rfl: 6    CONSTITUTIONAL:   Vitals:    09/21/21 1038   Temp: 97 9 °F (36 6 °C)   TempSrc: Temporal   Weight: 71 7 kg (158 lb)   Height: 5' 3" (1 6 m)         Specific Alerts:    Have you been seen by a [facility] D[facility]atologist in the last 3 years? YES    Are you pregnant or planning to become pregnant? No    Are you currently or planning to be nursing or breast feeding? No    Allergies   Allergen Reactions    Pollen Extract Itching, Dermatitis, Headache and Nasal Congestion       May we call your Preferred Phone number to discuss your specific medical information? YES    May we leave a detailed message that includes your specific medical information? YES    Have you traveled outside of the [facility] in the past 3 months? No    Do you currently have a pacemaker or defibrillator? No    Do you have any artificial heart valves, joints, plates, screws, rods, stents, pins, etc? No   - If Yes, were any placed within the last 2 years? Do you require any medications prior to a surgical procedure? No     Are you taking any medications that cause you to bleed more easily ("blood thinners") No    Have you ever experienced a rapid heartbeat with epinephrine? No    Have you ever been treated with "gold" (gold sodium thiomalate) therapy? No    [facility] can help with wrinkles, "laugh lines," facial volume loss, "double chin," "love handles," age spots, and more  Are you interested in learning today about some of the skin enhancement procedures that we offer? (If Yes, please provide more detail) No    Review of Systems:  Have you recently had or currently have any of the following?     · Fever or chills: No  · Night Sweats: No  · Headaches: No  · Weight Gain: No  · Weight Loss: No  · Blurry Vision: No  · Nausea: No  · Vomiting: No  · Diarrhea: No  · Blood in Stool: No  · Abdominal Pain: No  · Itchy Skin: No  · Painful Joints: No  · Swollen Joints: No  · Muscle Pain: No  · Irregular Mole: No  · Sun Burn: No  · Dry Skin: No  · Skin Color Changes: No  · Scar or Keloid: No  · Cold Sores/Fever Blisters: No  · Bacterial Infections/MRSA: No  · Anxiety: No  · Depression: No  · Suicidal or Homicidal Thoughts: No      PSYCH: Normal mood and affect  EYES: Normal conjunctiva  ENT: Normal lips and oral mucosa  CARDIOVASCULAR: No edema  RESPIRATORY: Normal respirations  HEME/LYMPH/IMMUNO:  No regional lymphadenopathy except as noted below in ASSESSMENT AND PLAN BY DIAGNOSIS    FULL ORGAN SYSTEM SKIN EXAM (SKIN)   Hair, Scalp, Ears, Face Normal except as noted below in Assessment   Neck, Cervical Chain Nodes Normal except as noted below in Assessment   Right Arm/Hand/Fingers Normal except as noted below in Assessment   Left Arm/Hand/Fingers Normal except as noted below in Assessment   Chest/Breasts/Axillae Viewed areas Normal except as noted below in Assessment   Abdomen, Umbilicus Normal except as noted below in Assessment   Back/Spine Normal except as noted below in Assessment   Groin/Genitalia/Buttocks Viewed areas Normal except as noted below in Assessment   Right Leg, Foot, Toes Normal except as noted below in Assessment   Left Leg, Foot, Toes Normal except as noted below in Assessment       PATCH TEST DAY 1    Assessment and Plan:  ? Area of body affected: Right chin; atrophic pink patch with telangectasia    bilateral face; urticarial red plaques   Prior treatment: Hydroxychlorquine 100 mg daily, zyrtec 10 mg and Heliocare    Indication for patch test: Discoid Lupus and Contact Dermatitis           Plan is to patch test using T R U E Test   Allergic contact dermatitis patch testing was explained to the patient  The patient understands that this testing is only a test, not a treatment  Therefore, avoidance of any allergen is the key to improvement of the eruption if an allergy is found  Additionally, the patient understands that there is a possibility of a negative test as there are over 4,300 known allergens and it is impossible to test for everything so we will test for the more common causes that can cause this pattern of pruritis   Recommended no showering, sweating or excessive moisture as this reduces the effectiveness of the test   Also, no oral steroids and do not apply topical steroids to the testing field   The patient understands that they must come to the clinic on Monday to have the patches placed, Wednesday to have the patches removed and an initial read performed,  and Friday of the testing week for the final reading  PROCEDURE: PATCH TEST APPLICATION    Total number of patches applied: 36 individual patches    The first panel was placed on the upper left side of patient's back with 1 3 marked in the upper left corner  The entire patch was smoothed, making sure each chamber made adequate contact with the skin  Grid 1 3 was placed over applied panel, a surgical marker was used to appropriately laura notches on skin  Hypafix was applied over patches  This was repeated for patches 1 2 and 1 3  PATIENT INSTRUCTIONS     After your patch tests are applied, you will need to return in two days (48 hours) to have your patch-test sites read  The appointments should have been made at the time your initial appointment was scheduled  Please do not take a bath or get your back wet until after you have completed all your patch test visits   Do not get your back wet between the time the patch tests are removed and the time of your final visit  Please do not take part in any strenuous activities that will loosen the tape on your back or cause you to perspire heavily  If the tape becomes loose, it may be reinforced with a medical tape from your home  If one or more patches hurt or become very itchy (more so than the others), they may be cut out and brought in separately  Ordinarily, this is not necessary  Leave the patches alone if you feel a generalized itch from the tape and cannot pinpoint exactly which patch is causing the discomfort  You may take an antihistamine for the itching, such as Benadryl  You may want to wear an OLD undershirt to prevent the adhesives and/or patch test substances from sticking to or staining your clothes during and after the patch tests are removed  Please call the patch test nurse at: [phone] to report any reactions occurring after your final patch test appointment          Scribe Attestation    I,:  Fermín Clayton am acting as a scribe while in the presence of the attending physician :       I,:  Joseph Luevano MD personally performed the services described in this documentation    as scribed in my presence :

## 2021-09-23 ENCOUNTER — OFFICE VISIT (OUTPATIENT)
Dept: DERMATOLOGY | Age: 46
End: 2021-09-23

## 2021-09-23 VITALS — BODY MASS INDEX: 27.64 KG/M2 | TEMPERATURE: 97.2 F | WEIGHT: 156 LBS | HEIGHT: 63 IN

## 2021-09-23 DIAGNOSIS — L23.9 ALLERGIC CONTACT DERMATITIS, UNSPECIFIED TRIGGER: Primary | ICD-10-CM

## 2021-09-23 PROCEDURE — RECHECK: Performed by: DERMATOLOGY

## 2021-09-23 PROCEDURE — 3008F BODY MASS INDEX DOCD: CPT | Performed by: FAMILY MEDICINE

## 2021-09-23 NOTE — PROGRESS NOTES
PATCH TEST DAY 2: NURSE VISIT ONLY    Physical Exam   Observation that tape stayed on correctly: No, only 1 3 and 2 3 stayed on; 3 3 was only stuck by outside perimeter of tape; partly hanging off   Itching or burning where allergens were placed: YES, burning on top 2 patches      Assessment and Plan:  Based on a thorough discussion of this condition and the management approach to it (including a comprehensive discussion of the known risks, side effects and potential benefits of treatment), the patient (family) agrees to implement the following specific plan:   Patches and tape were removed, marking were identified and darkened with surgical marking pen, all patient questions were answered    Since 3 3 patch did not stay on as planned; per Dr Carline Delcid -if nothing shows up on other patches will redo patch #3 3 that was placed on patients lower back  Scribe Attestation    I,:  Sergio Wall am acting as a scribe while in the presence of the attending physician :       I,:  Mike Lynn MD personally performed the services described in this documentation    as scribed in my presence :

## 2021-09-27 ENCOUNTER — OFFICE VISIT (OUTPATIENT)
Dept: DERMATOLOGY | Facility: CLINIC | Age: 46
End: 2021-09-27
Payer: COMMERCIAL

## 2021-09-27 VITALS — TEMPERATURE: 97.7 F | WEIGHT: 159.9 LBS | BODY MASS INDEX: 28.33 KG/M2

## 2021-09-27 DIAGNOSIS — L93.0 DISCOID LUPUS: ICD-10-CM

## 2021-09-27 PROCEDURE — 99213 OFFICE O/P EST LOW 20 MIN: CPT | Performed by: DERMATOLOGY

## 2021-09-27 RX ORDER — HYDROXYCHLOROQUINE SULFATE 200 MG/1
100 TABLET, FILM COATED ORAL
Qty: 15 TABLET | Refills: 2 | Status: SHIPPED | OUTPATIENT
Start: 2021-09-27 | End: 2021-10-27

## 2021-09-27 NOTE — PATIENT INSTRUCTIONS
LUPUS ERYTHEMATOSUS    Assessment and Plan:   Based on a thorough discussion of this condition and the management approach to it (including a comprehensive discussion of the known risks, side effects and potential benefits of treatment), the patient (family) agrees to implement the following specific plan:   Plaquenil 200 mg - take orally 0 5 tablets (100 mg) daily with breakfast   Stop using any products with Lanolin   Use pimecrolimus cream on entire face for both the lupus and eczema   Follow up in 3 months    What is lupus erythematosus? Lupus erythematosus (LE) is a connective tissue autoimmune disorder that can affect one or several organs  Circulating autoantibodies and immune complexes are due to loss of normal immune tolerance and are pathogenic  Clinical features of LE are highly variable  LE nearly always affects the skin to some degree  What is cutaneous lupus erythematosus? Cutaneous LE comprises several chronic and relapsing LE-specific and LE-nonspecific inflammatory conditions  There can be some overlap   LE-specific cutaneous LE has been classified as acute, subacute, intermittent and chronic  Lesions may be localized or generalized  In LE-specific cutaneous LE, lesions are often induced by exposure to sunlight   LE-nonspecific cutaneous LE may relate to systemic LE or another autoimmune disease  Who gets cutaneous lupus erythematosus? Cutaneous LE most often affects young to middle-aged adult women (aged 20-50 years) but children, the elderly, and males may be affected  Important predisposing factors for cutaneous LE include:   Female sex    Genes: ? 25 risk loci have been identified, and there are HLA associations    Skin of color    What causes lupus erythematosus? LE is classified as an autoimmune disorder, as it is associated with pathogenic antibodies directed against components of cell nuclei in various tissues   UVB irradiation causes keratinocyte necrosis, immune system activation and antibody formation  Factors that aggravate LE include:   Sun exposure    Cigarette smoking    Hormones    Viral infection    Certain drugs    What are the specific features of cutaneous lupus erythematosus? There are various types of cutaneous LE, classified as acute, subacute, intermittent and chronic cutaneous LE  The revised Cutaneous Lupus Erythematosus Disease Area and Severity Index (RCLASI) can be used to assess disease activity and damage  Acute cutaneous LE affects at least 50% of patients with systemic lupus erythematosus (SLE)  Many are sick, young, fair-skinned females  Specific features of acute cutaneous LE may include:   Malar eruption or 'butterfly rash' (erythema and oedema of cheeks, sparing nasolabial folds) lasting hours to days    Erythematous papular rash on arms, sometimes forming large plaques and spreading widely    Photosensitivity (a rash on all recently sun-exposed skin)    Cheilitis and mouth ulcers    Blisters (bullous LE) and erosions  SLE may also affect joints, kidneys, lungs, heart, liver, brain, blood vessels (vasculitis) and blood cells  It may be accompanied by the antiphospholipid syndrome  Subacute cutaneous lupus erythematosus    About 15% of patients with cutaneous LE have subacute cutaneous LE  One-third of the cases are due to previous drug exposure  Features of subacute cutaneous LE include:   Precipitation or aggravation by sun exposure    Non-itchy psoriasis-like papulosquamous rash on the upper back, chest and upper arms    Annular or polycyclic plaques that clear centrally    The absence of scarring when the rash has resolved  Up to 50% of patients with subacute cutaneous LE may also have a mild form of SLE, resulting in arthralgia (painful joints) or arthritis (joint disease) and low blood counts  Severe SLE is rare in patients with subacute cutaneous LE      More than 100 drugs have been associated with the onset of subacute cutaneous LE  They include:   Terbinafine    Tumor necrosis factor-alpha (TNF-?) inhibitors (biologics)    Anticonvulsants    Proton-pump inhibitors   cutaneous LE arises within 2 months of birth to mothers with known or subclinical subacute cutaneous LE  Features of  cutaneous LE may include:   An annular erythematous rash, which slowly resolves over 6 months    The rash is most often periorbital    Photosensitivity    Blood count abnormalities: hemolytic anemia, leukopenia, thrombocytopenia    Hepatobiliary disease    Persistent congenital heart block    A pediatrician should assess all babies born to mothers with subacute LE (or carrying anti-Ro/La) at birth  Mortality in babies with heart block is up to 20%, despite pacemaker implantation  Intermittent cutaneous LE, more often known as lupus tumidus, is a dermal form of lupus  Features of lupus tumidus include:   Affects sun-exposed sites such as cheeks, neck, anterior chest    Erythematous, urticaria-like patches and plaques with a smooth surface    Round or annular shapes    Clears during the winter months    Non-scarring    Lupus tumidus is similar to Jessner lymphocytic infiltrate, in which diagnostic criteria for lupus are absent  Chronic cutaneous LE accounts for 80% of presentations with cutaneous LE  About 25% of patients with chronic cutaneous LE also have systemic LE   Discoid LE  o Discoid LE is the most common form of chronic cutaneous LE  It is more prevalent in patients with skin of color, who are at greater risk of post inflammatory hyperpigmentation and hypertrophic scarring  o Discoid LE is confined to the skin above the neck in most patients but can spread below the neck to affect upper back, V of neck, forearms and backs of hands  o Scalp, ears, cheeks, nose are the most common sites  o Most patients have photosensitivity     o New lesions are destructive, erythematous scaly plaques with follicular prominence    o Scalp discoid LE presents as red, scaly and bald plaques  o Slow healing leads to post-inflammatory pigmentation and white scars  o Hair growth may partially or completely recover with treatment  Cicatricial (scarring) alopecia can be permanent   Hypertrophic LE  o Hypertrophic LE is a variant of discoid LE in which there are thickened and warty plaques resembling viral warts or skin cancers  Hypertrophic LE can occur on palms and/or soles  This is also called palmoplantar LE and is a form of acquired keratoderma   Mucosal LE  o Mucosal LE presents with plaques, ulcers and scaling  Mucosal lesions may predispose to squamous cell carcinoma  Common sites are:  o Lips and inside the mouth   o Lower eyelid with madarosis (loss of eyelashes)   o Rarely, vulva/penis   Lupus profundus  o Lupus profundus affects subcutaneous tissue  Other names for lupus profundus are lupus panniculitis and subcutaneous LE   Lupus profundus may develop at any age, including childhood   It may involve face, buttocks, limbs or anywhere   Firm deep and tender nodules persist for some months   Lesions resolve leaving dented, atrophic scars (lipoatrophy)  What are the nonspecific cutaneous features of lupus erythematosus? LE nonspecific cutaneous features are most often associated with SLE  They include:   Diffuse hair thinning    Urticaria    Raynaud phenomenon: abnormal blanching of fingers and toes in response to cold weather, followed by numbness and slow rewarming by the fingers which go blue then red   Lupus chilblains: painful erythematous nodules on fingers and toes during cooler months   Dilated periungual telangiectasia, ragged cuticles and nail dystrophy    Digital ulcers and pitting scars    Thrombophlebitis    Papular and nodular mucinosis on cheeks, upper chest, upper arms or back      Vasculitis: small vessel vasculitis, urticarial vasculitis and less often, vasculitis of medium and large vessels    Livedo reticularis and antiphospholipid syndrome    Complications of cutaneous lupus erythematosus   Chronic cutaneous LE causes facial deformity and scarring  The active and burned-out disease can lead to social isolation and depression  Systemic LE may involve heart, lung and brain with significant morbidity and mortality  Vasculitis and antiphospholipid syndrome involving internal organs can be serious  How is cutaneous lupus erythematosus diagnosed?  SLE is associated with high titer antinuclear antibodies   About 70% of patients with subacute LE have antiRo/La extractable nuclear antigens (SOHA)   The severity of LE may be reflected in the titer of ADIEL and/or SOHA   ADIEL and SOHA are often negative in a patient with chronic cutaneous LE     Mild anemia or leukopenia may be present in patients that do not have SLE    A skin biopsy may be diagnostic, showing a lichenoid tissue reaction and features specific to the kind of cutaneous LE  Direct immunofluorescence tests may show positive antibody deposition along the basement membrane (lupus band test)  Diagnostic features on biopsy are more likely to be found in LE-specific skin lesions than in LE-nonspecific cutaneous LE  Note: All women with positive anti-Ro or anti-La antibodies should be advised that if they become pregnant there may be a risk to their infant of developing  lupus and congenital heart block  Women with these antibodies should be referred to an obstetrician and consideration may be given to prophylactic administration of hydroxychloroquine or low dose prednisone to prevent heart block in the fetus  How can cutaneous lupus erythematosus be prevented? Carefully protect all exposed skin from sun exposure with covering clothing and SPF50+ broad-spectrum sunscreen (see sun protection)    Smoking cessation is essential - it is best to avoid nicotine replacement as nicotine in any form may exacerbate cutaneous LE  If subacute LE is drug-induced, stop the responsible medication  What is the treatment for cutaneous lupus erythematosus? The aim of treatment for cutaneous LE is to prevent flares, improve appearance and to prevent scarring  Local therapy   Potent or ultra potent topical steroids are applied to chronic discoid LE plaques   Calcineurin inhibitors, pimecrolimus cream or tacrolimus ointment can be used instead of topical steroids   Intralesional corticosteroid can be injected into small lesions resistant to topical therapy   Topical retinoid, calcipotriol and imiquimod have also been reported to be helpful in a few patients   Cosmetic camouflage may be used to disguise unsightly plaques  Systemic therapy  Treatment for cutaneous and systemic LE may include:   Antimalarials especially hydroxychloroquine    Immune modulators such as methotrexate, mycophenolate, dapsone, ciclosporin    Retinoids, ie acitretin, isotretinoin    Systemic corticosteroids    If sun protection is strict, vitamin D supplementation  Severe disease may require more aggressive treatment:   Cyclophosphamide    Thalidomide    Photopheresis    Intravenous immunoglobulin    Monoclonal antibodies targeting T and B cells and cytokines: rituximab  Procedures   Phototherapy using UVA1 may be useful to treat skin lesions of cutaneous LE   Photodynamic therapy (PDT) has been reported to clear chronic cutaneous LE   A vascular laser can reduce telangiectasia   Surgery may improve the appearance of disfiguring scars  What is the outlook for cutaneous lupus erythematosus?  The prognosis for cutaneous LE is variable   The skin involvement in SLE tends to mirror systemic involvement   Drug-induced SCLE clears within a few weeks of withdrawal of the responsible drug     Untreated chronic cutaneous LE tends to persist, but the severity is lessened by strict sun protection and avoidance of nicotine  PATCH TEST DAY 3    Patch Positive  During this previous week, the patient was patch tested to the TRUE TEST  Upon review, the patient had positive reactions to:      Cell Number 2: Wool Alcohol (lanolin) +1    Cell Number 12: [place] Dichloride +/-      Assessment and Plan:    Wool Alcohol (Lanolin)   Added positive allergens to allergy list    We explained the interpretation of the results to the patient and provided the patient with a semi-comprehensive list of appropriate products that they could use, while avoiding the allergens most effectively  Once again, we explained that the patch testing was only a test, and that the best outcome for the patient, would happen only if they adhered to the results found today  Patient was provided information sheets regarding the common and not so common locations of each positive allergen, which should be avoided  Any products that goes on patient's skin should be carefully reviewed, and if the chemicals or their potential cross-reactors (listed) are present, then these products should not be used

## 2021-09-27 NOTE — PROGRESS NOTES
[facility] Follow Up Note    Patient Name: Sebastien Bush  Encounter Date: 09/27/21    Today's Chief Concerns:  Learta January Concern #1:  Patch test day #3    Current Medications:    Current Outpatient Medications:     cetirizine (ZyrTEC) 10 mg tablet, Take 10 mg by mouth daily, Disp: , Rfl:     hydroxychloroquine (PLAQUENIL) 200 mg tablet, Take 0 5 tablets (100 mg total) by mouth daily with breakfast, Disp: 15 tablet, Rfl: 2    levothyroxine 50 mcg tablet, Take 1 tablet (50 mcg total) by mouth daily, Disp: 30 tablet, Rfl: 6    CONSTITUTIONAL:   Vitals:    09/27/21 1248   Temp: 97 7 °F (36 5 °C)   TempSrc: Temporal   Weight: 72 5 kg (159 lb 14 4 oz)     Specific Alerts:    Have you been seen by a [facility]ologist in the last 3 years? YES    Are you pregnant or planning to become pregnant? No    Are you currently or planning to be nursing or breast feeding? No    Allergies   Allergen Reactions    Pollen Extract Itching, Dermatitis, Headache and Nasal Congestion    Wool Alcohol [Lanolin] Rash       May we call your Preferred Phone number to discuss your specific medical information? YES    May we leave a detailed message that includes your specific medical information? YES    Have you traveled outside of the [facility] in the past 3 months? No    Do you currently have a pacemaker or defibrillator? No    Do you have any artificial heart valves, joints, plates, screws, rods, stents, pins, etc? No   - If Yes, were any placed within the last 2 years? Do you require any medications prior to a surgical procedure? No   - If Yes, for which procedure? - If Yes, what medications to you require? Are you taking any medications that cause you to bleed more easily ("blood thinners") No    Have you ever experienced a rapid heartbeat with epinephrine? No    Have you ever been treated with "gold" (gold sodium thiomalate) therapy?  No    [facility] can help with wrinkles, "laugh lines," facial volume loss, "double chin," "love handles," age spots, and more  Are you interested in learning today about some of the skin enhancement procedures that we offer? (If Yes, please provide more detail) No    Review of Systems:  Have you recently had or currently have any of the following? · Fever or chills: No  · Night Sweats: No  · Headaches: No  · Weight Gain: No  · Weight Loss: No  · Blurry Vision: No  · Nausea: No  · Vomiting: No  · Diarrhea: No  · Blood in Stool: No  · Abdominal Pain: No  · Itchy Skin: No  · Painful Joints: No  · Swollen Joints: No  · Muscle Pain: No  · Irregular Mole: No  · Sun Burn: No  · Dry Skin: No  · Skin Color Changes: No  · Scar or Keloid: No  · Cold Sores/Fever Blisters: No  · Bacterial Infections/MRSA: No  · Anxiety: No  · Depression: No  · Suicidal or Homicidal Thoughts: No      PSYCH: Normal mood and affect  EYES: Normal conjunctiva  ENT: Normal lips and oral mucosa  CARDIOVASCULAR: No edema  RESPIRATORY: Normal respirations  HEME/LYMPH/IMMUNO:  No regional lymphadenopathy except as noted below in ASSESSMENT AND PLAN BY DIAGNOSIS    FULL ORGAN SYSTEM SKIN EXAM (SKIN)   Hair, Scalp, Ears, Face Normal except as noted below in Assessment     LUPUS ERYTHEMATOSUS with POSSIBLE CONTACT DERMATITIS    Physical Exam:   Anatomic Location Affected:  1  Right chin 2  Forehead and cheeks   Morphological Description:  1  Slightly atrophic violaceous plaque with telangiectasias 2  Pink patches with scale with a few telangiectasias   Pertinent Positives:   Pertinent Negatives:     Additional History of Present Condition:  Overall improved with pimecrolimus and aloe on jaw, rash on rest of face itches unlike jaw, which is tender, and has not been using an treatment there    Assessment and Plan: The patch of DLE on R chin appears to be improving with the aloe, the rash on the forehead and cheeks is thought to be a contact dermatitis because it was initially patchy and pruritic and patient noted it got worse with sunscreen, plus patch test was positive for lanolin  Will have her avoid this, treat entire face with pimecrolimus and add plaquenil  Based on a thorough discussion of this condition and the management approach to it (including a comprehensive discussion of the known risks, side effects and potential benefits of treatment), the patient (family) agrees to implement the following specific plan:   Plaquenil 200 mg - take orally 0 5 tablets (100 mg) daily with breakfast   Stop using any products with Lanolin   Use pimecrolimus cream on entire face for both the lupus and eczema   Follow up in 3 months    What is lupus erythematosus? Lupus erythematosus (LE) is a connective tissue autoimmune disorder that can affect one or several organs  Circulating autoantibodies and immune complexes are due to loss of normal immune tolerance and are pathogenic  Clinical features of LE are highly variable  LE nearly always affects the skin to some degree  What is cutaneous lupus erythematosus? Cutaneous LE comprises several chronic and relapsing LE-specific and LE-nonspecific inflammatory conditions  There can be some overlap   LE-specific cutaneous LE has been classified as acute, subacute, intermittent and chronic  Lesions may be localized or generalized  In LE-specific cutaneous LE, lesions are often induced by exposure to sunlight   LE-nonspecific cutaneous LE may relate to systemic LE or another autoimmune disease  Who gets cutaneous lupus erythematosus? Cutaneous LE most often affects young to middle-aged adult women (aged 20-50 years) but children, the elderly, and males may be affected  Important predisposing factors for cutaneous LE include:   Female sex    Genes: ? 25 risk loci have been identified, and there are HLA associations    Skin of color    What causes lupus erythematosus?   LE is classified as an autoimmune disorder, as it is associated with pathogenic antibodies directed against components of cell nuclei in various tissues  UVB irradiation causes keratinocyte necrosis, immune system activation and antibody formation  Factors that aggravate LE include:   Sun exposure    Cigarette smoking    Hormones    Viral infection    Certain drugs    What are the specific features of cutaneous lupus erythematosus? There are various types of cutaneous LE, classified as acute, subacute, intermittent and chronic cutaneous LE  The revised Cutaneous Lupus Erythematosus Disease Area and Severity Index (RCLASI) can be used to assess disease activity and damage  Acute cutaneous LE affects at least 50% of patients with systemic lupus erythematosus (SLE)  Many are sick, young, fair-skinned females  Specific features of acute cutaneous LE may include:   Malar eruption or 'butterfly rash' (erythema and oedema of cheeks, sparing nasolabial folds) lasting hours to days    Erythematous papular rash on arms, sometimes forming large plaques and spreading widely    Photosensitivity (a rash on all recently sun-exposed skin)    Cheilitis and mouth ulcers    Blisters (bullous LE) and erosions  SLE may also affect joints, kidneys, lungs, heart, liver, brain, blood vessels (vasculitis) and blood cells  It may be accompanied by the antiphospholipid syndrome  Subacute cutaneous lupus erythematosus    About 15% of patients with cutaneous LE have subacute cutaneous LE  One-third of the cases are due to previous drug exposure  Features of subacute cutaneous LE include:   Precipitation or aggravation by sun exposure    Non-itchy psoriasis-like papulosquamous rash on the upper back, chest and upper arms    Annular or polycyclic plaques that clear centrally    The absence of scarring when the rash has resolved      Up to 50% of patients with subacute cutaneous LE may also have a mild form of SLE, resulting in arthralgia (painful joints) or arthritis (joint disease) and low blood counts  Severe SLE is rare in patients with subacute cutaneous LE  More than 100 drugs have been associated with the onset of subacute cutaneous LE  They include:   Terbinafine    Tumor necrosis factor-alpha (TNF-?) inhibitors (biologics)    Anticonvulsants    Proton-pump inhibitors   cutaneous LE arises within 2 months of birth to mothers with known or subclinical subacute cutaneous LE  Features of  cutaneous LE may include:   An annular erythematous rash, which slowly resolves over 6 months    The rash is most often periorbital    Photosensitivity    Blood count abnormalities: hemolytic anemia, leukopenia, thrombocytopenia    Hepatobiliary disease    Persistent congenital heart block    A pediatrician should assess all babies born to mothers with subacute LE (or carrying anti-Ro/La) at birth  Mortality in babies with heart block is up to 20%, despite pacemaker implantation  Intermittent cutaneous LE, more often known as lupus tumidus, is a dermal form of lupus  Features of lupus tumidus include:   Affects sun-exposed sites such as cheeks, neck, anterior chest    Erythematous, urticaria-like patches and plaques with a smooth surface    Round or annular shapes    Clears during the winter months    Non-scarring    Lupus tumidus is similar to Jessner lymphocytic infiltrate, in which diagnostic criteria for lupus are absent  Chronic cutaneous LE accounts for 80% of presentations with cutaneous LE  About 25% of patients with chronic cutaneous LE also have systemic LE   Discoid LE  o Discoid LE is the most common form of chronic cutaneous LE  It is more prevalent in patients with skin of color, who are at greater risk of post inflammatory hyperpigmentation and hypertrophic scarring  o Discoid LE is confined to the skin above the neck in most patients but can spread below the neck to affect upper back, V of neck, forearms and backs of hands     o Scalp, ears, cheeks, nose are the most common sites  o Most patients have photosensitivity  o New lesions are destructive, erythematous scaly plaques with follicular prominence    o Scalp discoid LE presents as red, scaly and bald plaques  o Slow healing leads to post-inflammatory pigmentation and white scars  o Hair growth may partially or completely recover with treatment  Cicatricial (scarring) alopecia can be permanent   Hypertrophic LE  o Hypertrophic LE is a variant of discoid LE in which there are thickened and warty plaques resembling viral warts or skin cancers  Hypertrophic LE can occur on palms and/or soles  This is also called palmoplantar LE and is a form of acquired keratoderma   Mucosal LE  o Mucosal LE presents with plaques, ulcers and scaling  Mucosal lesions may predispose to squamous cell carcinoma  Common sites are:  o Lips and inside the mouth   o Lower eyelid with madarosis (loss of eyelashes)   o Rarely, vulva/penis   Lupus profundus  o Lupus profundus affects subcutaneous tissue  Other names for lupus profundus are lupus panniculitis and subcutaneous LE   Lupus profundus may develop at any age, including childhood   It may involve face, buttocks, limbs or anywhere   Firm deep and tender nodules persist for some months   Lesions resolve leaving dented, atrophic scars (lipoatrophy)  What are the nonspecific cutaneous features of lupus erythematosus? LE nonspecific cutaneous features are most often associated with SLE  They include:   Diffuse hair thinning    Urticaria    Raynaud phenomenon: abnormal blanching of fingers and toes in response to cold weather, followed by numbness and slow rewarming by the fingers which go blue then red   Lupus chilblains: painful erythematous nodules on fingers and toes during cooler months      Dilated periungual telangiectasia, ragged cuticles and nail dystrophy    Digital ulcers and pitting scars    Thrombophlebitis    Papular and nodular mucinosis on cheeks, upper chest, upper arms or back   Vasculitis: small vessel vasculitis, urticarial vasculitis and less often, vasculitis of medium and large vessels    Livedo reticularis and antiphospholipid syndrome    Complications of cutaneous lupus erythematosus   Chronic cutaneous LE causes facial deformity and scarring  The active and burned-out disease can lead to social isolation and depression  Systemic LE may involve heart, lung and brain with significant morbidity and mortality  Vasculitis and antiphospholipid syndrome involving internal organs can be serious  How is cutaneous lupus erythematosus diagnosed?  SLE is associated with high titer antinuclear antibodies   About 70% of patients with subacute LE have antiRo/La extractable nuclear antigens (SOHA)   The severity of LE may be reflected in the titer of ADIEL and/or SOHA   ADIEL and SOHA are often negative in a patient with chronic cutaneous LE     Mild anemia or leukopenia may be present in patients that do not have SLE    A skin biopsy may be diagnostic, showing a lichenoid tissue reaction and features specific to the kind of cutaneous LE  Direct immunofluorescence tests may show positive antibody deposition along the basement membrane (lupus band test)  Diagnostic features on biopsy are more likely to be found in LE-specific skin lesions than in LE-nonspecific cutaneous LE  Note: All women with positive anti-Ro or anti-La antibodies should be advised that if they become pregnant there may be a risk to their infant of developing  lupus and congenital heart block  Women with these antibodies should be referred to an obstetrician and consideration may be given to prophylactic administration of hydroxychloroquine or low dose prednisone to prevent heart block in the fetus  How can cutaneous lupus erythematosus be prevented?   Carefully protect all exposed skin from sun exposure with covering clothing and SPF50+ broad-spectrum sunscreen (see sun protection)  Smoking cessation is essential - it is best to avoid nicotine replacement as nicotine in any form may exacerbate cutaneous LE  If subacute LE is drug-induced, stop the responsible medication  What is the treatment for cutaneous lupus erythematosus? The aim of treatment for cutaneous LE is to prevent flares, improve appearance and to prevent scarring  Local therapy   Potent or ultra potent topical steroids are applied to chronic discoid LE plaques   Calcineurin inhibitors, pimecrolimus cream or tacrolimus ointment can be used instead of topical steroids   Intralesional corticosteroid can be injected into small lesions resistant to topical therapy   Topical retinoid, calcipotriol and imiquimod have also been reported to be helpful in a few patients   Cosmetic camouflage may be used to disguise unsightly plaques  Systemic therapy  Treatment for cutaneous and systemic LE may include:   Antimalarials especially hydroxychloroquine    Immune modulators such as methotrexate, mycophenolate, dapsone, ciclosporin    Retinoids, ie acitretin, isotretinoin    Systemic corticosteroids    If sun protection is strict, vitamin D supplementation  Severe disease may require more aggressive treatment:   Cyclophosphamide    Thalidomide    Photopheresis    Intravenous immunoglobulin    Monoclonal antibodies targeting T and B cells and cytokines: rituximab  Procedures   Phototherapy using UVA1 may be useful to treat skin lesions of cutaneous LE   Photodynamic therapy (PDT) has been reported to clear chronic cutaneous LE   A vascular laser can reduce telangiectasia   Surgery may improve the appearance of disfiguring scars  What is the outlook for cutaneous lupus erythematosus?  The prognosis for cutaneous LE is variable   The skin involvement in SLE tends to mirror systemic involvement     Drug-induced SCLE clears within a few weeks of withdrawal of the responsible drug   Untreated chronic cutaneous LE tends to persist, but the severity is lessened by strict sun protection and avoidance of nicotine  PATCH TEST DAY 3    Patch Positive  During this previous week, the patient was patch tested to the TRUE TEST  Upon review, the patient had positive reactions to:      Cell Number 2: Wool Alcohol (lanolin) +1    Cell Number 12: Rydal Dichloride +/-      Assessment and Plan:    Wool Alcohol (Lanolin)   Added positive allergens to allergy list    We explained the interpretation of the results to the patient and provided the patient with a semi-comprehensive list of appropriate products that they could use, while avoiding the allergens most effectively  Once again, we explained that the patch testing was only a test, and that the best outcome for the patient, would happen only if they adhered to the results found today  Patient was provided information sheets regarding the common and not so common locations of each positive allergen, which should be avoided  Any products that goes on patient's skin should be carefully reviewed, and if the chemicals or their potential cross-reactors (listed) are present, then these products should not be used         Scribe Attestation    I,:  Brit Miner am acting as a scribe while in the presence of the attending physician :       I,:  Joyce Little MD personally performed the services described in this documentation    as scribed in my presence :

## 2021-12-10 DIAGNOSIS — E03.9 ACQUIRED HYPOTHYROIDISM: ICD-10-CM

## 2021-12-10 RX ORDER — LEVOTHYROXINE SODIUM 0.05 MG/1
50 TABLET ORAL DAILY
Qty: 30 TABLET | Refills: 0 | Status: SHIPPED | OUTPATIENT
Start: 2021-12-10 | End: 2022-01-03

## 2021-12-21 ENCOUNTER — OFFICE VISIT (OUTPATIENT)
Dept: DERMATOLOGY | Age: 46
End: 2021-12-21
Payer: COMMERCIAL

## 2021-12-21 VITALS — HEIGHT: 64 IN | WEIGHT: 160 LBS | TEMPERATURE: 97.4 F | BODY MASS INDEX: 27.31 KG/M2

## 2021-12-21 DIAGNOSIS — L93.0 DISCOID LUPUS: Primary | ICD-10-CM

## 2021-12-21 PROCEDURE — 1036F TOBACCO NON-USER: CPT | Performed by: DERMATOLOGY

## 2021-12-21 PROCEDURE — 3008F BODY MASS INDEX DOCD: CPT | Performed by: DERMATOLOGY

## 2021-12-21 PROCEDURE — 99213 OFFICE O/P EST LOW 20 MIN: CPT | Performed by: DERMATOLOGY

## 2021-12-21 RX ORDER — PIMECROLIMUS 10 MG/G
CREAM TOPICAL 2 TIMES DAILY
Qty: 60 G | Refills: 2 | Status: SHIPPED | OUTPATIENT
Start: 2021-12-21 | End: 2022-06-15 | Stop reason: SDUPTHER

## 2022-01-03 DIAGNOSIS — E03.9 ACQUIRED HYPOTHYROIDISM: ICD-10-CM

## 2022-01-03 RX ORDER — LEVOTHYROXINE SODIUM 0.05 MG/1
TABLET ORAL
Qty: 30 TABLET | Refills: 0 | Status: SHIPPED | OUTPATIENT
Start: 2022-01-03 | End: 2022-01-31

## 2022-01-13 ENCOUNTER — TELEPHONE (OUTPATIENT)
Dept: DERMATOLOGY | Facility: CLINIC | Age: 47
End: 2022-01-13

## 2022-01-13 NOTE — TELEPHONE ENCOUNTER
Pt Dr  office called for our fax number gave them our [address] fax # [phone] and office also checked to make sure the pt has been seen with us before, pt has been seen by Dr Ayaz Ortiz at the [place] location at her last appt 12/21/21

## 2022-01-25 ENCOUNTER — TELEMEDICINE (OUTPATIENT)
Dept: FAMILY MEDICINE CLINIC | Facility: CLINIC | Age: 47
End: 2022-01-25
Payer: COMMERCIAL

## 2022-01-25 DIAGNOSIS — B34.9 VIRAL ILLNESS: Primary | ICD-10-CM

## 2022-01-25 PROCEDURE — 1036F TOBACCO NON-USER: CPT | Performed by: FAMILY MEDICINE

## 2022-01-25 PROCEDURE — 99213 OFFICE O/P EST LOW 20 MIN: CPT | Performed by: FAMILY MEDICINE

## 2022-01-25 NOTE — PROGRESS NOTES
Virtual Regular Visit    Verification of patient location:    Patient is located in the following state in which I hold an active license [place]      Assessment/Plan:    Problem List Items Addressed This Visit        Other    Viral illness - Primary     otc tylenol or advil increase fluids will get covid test         Relevant Orders    COVID Only - Collected at   [facility] or [facility]               Reason for visit is chills weakness  Chief Complaint   Patient presents with    Virtual Regular Visit        Encounter provider Margret Baires MD    Provider located at [address] [place] [id number] [phone]      Recent Visits  No visits were found meeting these conditions  Showing recent visits within past 7 days and meeting all other requirements  Today's Visits  Date Type Provider Dept   01/25/22 Telemedicine Margret Baires MD  [address] today's visits and meeting all other requirements  Future Appointments  No visits were found meeting these conditions  Showing future appointments within next 150 days and meeting all other requirements       The patient was identified by name and date of birth  Venessa Griffith was informed that this is a telemedicine visit and that the visit is being conducted through [address] [place] [address] Now and patient was informed that this is a secure, HIPAA-compliant platform  She agrees to proceed     My office door was closed  No one else was in the room  She acknowledged consent and understanding of privacy and security of the video platform  The patient has agreed to participate and understands they can discontinue the visit at any time  Patient is aware this is a billable service       Subjective  Venessa Griffith is a 48 y o  female 3 days of chills fatigue and scratchy throat has come congestion partially vaccinated had rapid test yesterday for covid and was negative   No cough has nausea no vomiitng no diarrhea no abd pain  HPI     Past Medical History:   Diagnosis Date    Allergic Pollen    Disease of thyroid gland Hipotiroidismo    Eczema     Seasonal allergies        Past Surgical History:   Procedure Laterality Date    MAMMO (HISTORICAL)  2019       Current Outpatient Medications   Medication Sig Dispense Refill    cetirizine (ZyrTEC) 10 mg tablet Take 10 mg by mouth daily      hydroxychloroquine (PLAQUENIL) 200 mg tablet Take 0 5 tablets (100 mg total) by mouth daily with breakfast 15 tablet 2    levothyroxine 50 mcg tablet TAKE 1 TABLET BY MOUTH EVERY DAY 30 tablet 0    pimecrolimus (ELIDEL) 1 % cream Apply topically 2 (two) times a day 60 g 2     No current facility-administered medications for this visit  Allergies   Allergen Reactions    Pollen Extract Itching, Dermatitis, Headache and Nasal Congestion    Wool Alcohol [Lanolin] Rash       Review of Systems   Constitutional: Positive for chills and fatigue  Negative for appetite change and fever  HENT: Positive for congestion and sore throat  Negative for rhinorrhea and sinus pain  Eyes: Negative for discharge  Respiratory: Negative for cough, shortness of breath and wheezing  Cardiovascular: Negative for chest pain and palpitations  Gastrointestinal: Negative for abdominal pain, diarrhea, nausea and vomiting  Musculoskeletal: Negative for myalgias  Neurological: Negative for headaches  Psychiatric/Behavioral: Negative for dysphoric mood  The patient is not nervous/anxious  Video Exam    There were no vitals filed for this visit  Physical Exam  Constitutional:       General: She is not in acute distress  Appearance: Normal appearance  She is well-developed  She is not ill-appearing  Eyes:      Extraocular Movements: Extraocular movements intact  Neck:      Thyroid: No thyromegaly  Cardiovascular:      Rate and Rhythm: Normal rate     Pulmonary:      Effort: Pulmonary effort is normal  No respiratory distress  Musculoskeletal:      Cervical back: Normal range of motion  Neurological:      General: No focal deficit present  Mental Status: She is alert and oriented to person, place, and time  Mental status is at baseline  Psychiatric:         Mood and Affect: Mood normal          Behavior: Behavior normal             VIRTUAL VISIT [facility] verbally agrees to participate in [employer]  Pt is aware that [employer] could be limited without vital signs or the ability to perform a full hands-on physical Michaela Henson understands she or the provider may request at any time to terminate the video visit and request the patient to seek care or treatment in person

## 2022-01-26 PROCEDURE — U0003 INFECTIOUS AGENT DETECTION BY NUCLEIC ACID (DNA OR RNA); SEVERE ACUTE RESPIRATORY SYNDROME CORONAVIRUS 2 (SARS-COV-2) (CORONAVIRUS DISEASE [COVID-19]), AMPLIFIED PROBE TECHNIQUE, MAKING USE OF HIGH THROUGHPUT TECHNOLOGIES AS DESCRIBED BY CMS-2020-01-R: HCPCS | Performed by: FAMILY MEDICINE

## 2022-01-26 PROCEDURE — U0005 INFEC AGEN DETEC AMPLI PROBE: HCPCS | Performed by: FAMILY MEDICINE

## 2022-02-04 ENCOUNTER — TELEPHONE (OUTPATIENT)
Dept: DERMATOLOGY | Facility: CLINIC | Age: 47
End: 2022-02-04

## 2022-02-04 NOTE — TELEPHONE ENCOUNTER
Second call RE: Request for medical records, Reference #[id number]  Tried returning call, however, it was wrong #  Will have another MR review message and/or wait for return call  Initial call 2/3, I did provide phone and fax # for medical records office to same caller

## 2022-06-15 ENCOUNTER — OFFICE VISIT (OUTPATIENT)
Dept: DERMATOLOGY | Age: 47
End: 2022-06-15
Payer: COMMERCIAL

## 2022-06-15 VITALS — WEIGHT: 158 LBS | BODY MASS INDEX: 27.46 KG/M2 | TEMPERATURE: 97.6 F

## 2022-06-15 DIAGNOSIS — L93.0 DISCOID LUPUS: ICD-10-CM

## 2022-06-15 PROCEDURE — 1036F TOBACCO NON-USER: CPT | Performed by: DERMATOLOGY

## 2022-06-15 PROCEDURE — 99213 OFFICE O/P EST LOW 20 MIN: CPT | Performed by: DERMATOLOGY

## 2022-06-15 RX ORDER — PIMECROLIMUS 10 MG/G
CREAM TOPICAL 2 TIMES DAILY
Qty: 60 G | Refills: 2 | Status: SHIPPED | OUTPATIENT
Start: 2022-06-15 | End: 2022-07-15

## 2022-06-15 NOTE — PATIENT INSTRUCTIONS
DISCOID LUPUS    Assessment and Plan:  Based on a thorough discussion of this condition and the management approach to it (including a comprehensive discussion of the known risks, side effects and potential benefits of treatment), the patient (family) agrees to implement the following specific plan:  Continue with pimecrolimus 1% cream - apply topically twice daily to active areas on face  Follow up in May of 2023     What is discoid lupus  Lupus erythematosus (LE) is a group of inflammatory autoimmune diseases and often affects the skin  Discoid lupus erythematosus (DLE) is the most common chronic form of cutaneous lupus  It is characterized by persistent scaly, disk-like plaques on scalp, face and ears that may cause pigmentary changes, scarring and hair loss  Factors leading to DLE include:  Genetics  Sun exposure (often several weeks before presentation)  Toxins such as cigarette smoke  Hormones  The manifestations of DLE are due to loss of regulation of the immune system in the skin  DLE can affect males and females of any age  DLE is five times more common in females than males, and onset is most often between the ages of 21 and 36 years  DLE is more common than systemic lupus erythematosus (SLE)  The estimated prevalence is around 20-40 people in every 100,000  DLE may be more common in patients with darker colored skin  DLE is more common and more severe in smokers compared to non-smokers  Smoking also reduces the effectiveness of antimalarials and other therapies  What are the symptoms of discoid lupus? Most patients with DLE just have skin involvement (cutaneous LE)  Between 5% and 25% of patients with DLE develop SLE, in which there may be other forms of cutaneous lupus, and other organs may develop the disease  Typically, systemic symptoms are mild in these patients  DLE may be localized (above neck in 80%) or generalized (above and below the neck in 20%)    Signs of localized DLE include:  Initial lesions are dry red patches  These evolve to red or hyperpigmented plaques with scale  Follicular keratosis, or plugs of keratin within hair follicles, is noted when the surface scale is removed, for example with tape (carpet-tack sign)  Older lesions are hyperpigmented, especially on the edge of the plaques  Scarring results in central loss of pigment (white patches) and skin atrophy (tissue loss)  DLE is typically located on the nose, cheeks, ear lobe and mil  It may involve lips, oral mucosa, nose or eyelids  Scalp lesions cause temporary or permanent patches of hair loss  Hypertrophic (warty) lupus erythematous describes red, very thickened plaques  Signs of generalized DLE include:  Plaques on anterior chest, upper back, backs of hands  Sometimes, plaques on upper and lower limbs  Can affect palms and soles  Can affect anogenital mucosa  The plaques may be itchy or sore    How is discoid lupus erythematosus diagnosed? DLE is often diagnosed from its distribution in sun-exposed sites and the clinical appearance of the plaques  After a careful history, the patient with DLE should undergo a thorough general examination, to find out if other forms of lupus may be present  The diagnosis is usually confirmed by skin biopsy  Direct immunofluorescence is often positive in lesional skin in DLE (positive lupus band test)  The Cutaneous Lupus Erythematosus Disease Area and Severity Index (CLASI) was developed in an attempt to classify the severity of cutaneous LE  A score of activity and damage due to the disease is calculated in each of 12 anatomical locations (refer to the original published paper for details)  Blood tests: Patients with DLE will usually have blood tests at the time of diagnosis and from time to time afterwards    Full blood count  Renal function test  Inflammatory markers such as C-reactive protein (CRP)  Antinuclear antibody (ADIEL, ANF; if present, they are usually in low titre)  Extractable nuclear antibody (SOHA)  Anti-annexin 1 antibodies--these may be a diagnostic marker for discoid BUSTER    Circulating autoantibodies are found in about 50% of patients with DLE  What is the treatment for discoid lupus erythematosus? The following measures are important to reduce the chance of flares of DLE  Careful year-round protection from sun exposure using clothing, accessories and thickly applied SPF 50+ broad-spectrum sunscreens  Sunscreens alone are not adequate  Indoors, some patients may also need to stay away from glass windows, or these can be treated with UV-blocking films  Vitamin D supplements should be recommended for those who strictly avoid the sun  Smoking cessation  Topical therapy  Intermittent courses of potent topical corticosteroids are the main treatment for DLE  They should be applied accurately to the skin lesions for several weeks  Potency should be selected to suit the body site and thickness of the plaque  Very potent topical steroids may cause thinning of the surrounding skin and increase blood vessel formation (telangiectasia)  Intralesional injections of corticosteroids are sometimes used, especially for hypertrophic DLE  The calcineurin inhibitors tacrolimus ointment and pimecrolimus cream can also be used  Camouflage makeup may be used to improve cosmetic appearance  Systemic therapy  Typically, any of the following drugs may be used to treat DLE alone or in combination  Treatment is less effective in smokers than in non-smokers  Hydroxychloroquine and other antimalarials--response rates are about 80% in BUSTER  Systemic corticosteroids such as prednisone or prednisolone  These are rarely required for DLE  Methotrexate--best response in subacute BUSTER and discoid BUSTER  Retinoids isotretinoin and acitretin  Mycophenolate  Azathioprine  Dapsone  Thalidomide  Belimumab    DLE tends to persist for years or decades   In some patients, all signs of active disease resolve in time  About 25% of patients with discoid lupus erythematosus also develop systemic lupus erythematosus within months to decades of the diagnosis of skin disease  Discoid lupus erythematosus may leave permanent scars, even when the active disease has responded to treatment  Squamous cell carcinoma can rarely arise within a longstanding DLE plaque in the skin or mucous membrane  It presents as an enlarging warty growth or ulcer  It is usually treated surgically

## 2022-06-15 NOTE — PROGRESS NOTES
[facility] Follow Up Note    Patient Name: Clyde Atkinson  Encounter Date: 06/15/22    Today's Chief Concerns:  Roldan Pineda Concern #1:  F/u discoid lupus    Current Medications:    Current Outpatient Medications:     cetirizine (ZyrTEC) 10 mg tablet, Take 10 mg by mouth daily, Disp: , Rfl:     levothyroxine 50 mcg tablet, TAKE 1 TABLET BY MOUTH EVERY DAY, Disp: 30 tablet, Rfl: 5    hydroxychloroquine (PLAQUENIL) 200 mg tablet, Take 0 5 tablets (100 mg total) by mouth daily with breakfast, Disp: 15 tablet, Rfl: 2    pimecrolimus (ELIDEL) 1 % cream, Apply topically 2 (two) times a day, Disp: 60 g, Rfl: 2    CONSTITUTIONAL:   Vitals:    06/15/22 1407   Temp: 97 6 °F (36 4 °C)   TempSrc: Temporal   Weight: 71 7 kg (158 lb)       Specific Alerts:    Have you been seen by a [facility] Dermatologist in the last 3 years? YES    Are you pregnant or planning to become pregnant? No    Are you currently or planning to be nursing or breast feeding? No    Allergies   Allergen Reactions    Pollen Extract Itching, Dermatitis, Headache and Nasal Congestion    Wool Alcohol [Lanolin] Rash       May we call your Preferred Phone number to discuss your specific medical information? YES    May we leave a detailed message that includes your specific medical information? YES     Have you traveled outside of the [facility] in the past 3 months? No    Do you currently have a pacemaker or defibrillator? No    Do you have any artificial heart valves, joints, plates, screws, rods, stents, pins, etc? No   - If Yes, were any placed within the last 2 years? Do you require any medications prior to a surgical procedure? No   - If Yes, for which procedure? - If Yes, what medications to you require? Are you taking any medications that cause you to bleed more easily ("blood thinners") No    Have you ever experienced a rapid heartbeat with epinephrine?  No    Review of Systems:  Have you recently had or currently have any of the following? · Fever or chills: No  · Night Sweats: No  · Headaches: No  · Weight Gain: No  · Weight Loss: No  · Blurry Vision: No  · Nausea: No  · Vomiting: No  · Diarrhea: No  · Blood in Stool: No  · Abdominal Pain: No  · Itchy Skin: No  · Painful Joints: No  · Swollen Joints: No  · Muscle Pain: No  · Irregular Mole: No  · Sun Burn: No  · Dry Skin: No  · Skin Color Changes: No  · Scar or Keloid: No  · Cold Sores/Fever Blisters: No  · Bacterial Infections/MRSA: No  · Anxiety: No  · Depression: No  · Suicidal or Homicidal Thoughts: No      PSYCH: Normal mood and affect  EYES: Normal conjunctiva  ENT: Normal lips and oral mucosa  CARDIOVASCULAR: No edema  RESPIRATORY: Normal respirations  HEME/LYMPH/IMMUNO:  No regional lymphadenopathy except as noted below in ASSESSMENT AND PLAN BY DIAGNOSIS    FULL ORGAN SYSTEM SKIN EXAM (SKIN)   Hair, Scalp, Ears, Face Normal except as noted below in Assessment       DISCOID LUPUS    Physical Exam:   Anatomic Location Affected:  Right and left jaw   Morphological Description:  Right jaw hyperpigmented patch, left jaw 3 mm atrophic pink plaque   Pertinent Positives:   Pertinent Negatives: scalp, ears clear    Additional History of Present Condition:  Patient presents for 4 month discoid lupus follow up  Patient has been using pimecrolimus 1% cream topically 2x/day on her face, has seen improvement  Assessment and Plan: 1 small new spot, original lesion appears inactive    Manage with just elidel for now and add plaquenil back if starts to spread  Based on a thorough discussion of this condition and the management approach to it (including a comprehensive discussion of the known risks, side effects and potential benefits of treatment), the patient (family) agrees to implement the following specific plan:   Continue with pimecrolimus 1% cream - apply topically twice daily only to active areas on face   Follow up in May of 2023     What is discoid lupus  Lupus erythematosus (LE) is a group of inflammatory autoimmune diseases and often affects the skin  Discoid lupus erythematosus (DLE) is the most common chronic form of cutaneous lupus  It is characterized by persistent scaly, disk-like plaques on scalp, face and ears that may cause pigmentary changes, scarring and hair loss  Factors leading to DLE include:   [employer] exposure (often several weeks before presentation)   Toxins such as cigarette smoke   Hormones  The manifestations of DLE are due to loss of regulation of the immune system in the skin  DLE can affect males and females of any age  DLE is five times more common in females than males, and onset is most often between the ages of 21 and 36 years  DLE is more common than systemic lupus erythematosus (SLE)  The estimated prevalence is around 20-40 people in every 100,000  DLE may be more common in patients with darker colored skin  DLE is more common and more severe in smokers compared to non-smokers  Smoking also reduces the effectiveness of antimalarials and other therapies  What are the symptoms of discoid lupus? Most patients with DLE just have skin involvement (cutaneous LE)  Between 5% and 25% of patients with DLE develop SLE, in which there may be other forms of cutaneous lupus, and other organs may develop the disease  Typically, systemic symptoms are mild in these patients  DLE may be localized (above neck in 80%) or generalized (above and below the neck in 20%)    Signs of localized DLE include:   Initial lesions are dry red patches   These evolve to red or hyperpigmented plaques with scale   Follicular keratosis, or plugs of keratin within hair follicles, is noted when the surface scale is removed, for example with tape (carpet-tack sign)   Older lesions are hyperpigmented, especially on the edge of the plaques   Scarring results in central loss of pigment (white patches) and skin atrophy (tissue loss)   DLE is typically located on the nose, cheeks, ear lobe and mil   It may involve lips, oral mucosa, nose or eyelids   Scalp lesions cause temporary or permanent patches of hair loss   Hypertrophic (warty) lupus erythematous describes red, very thickened plaques  Signs of generalized DLE include:   Plaques on anterior chest, upper back, backs of hands   Sometimes, plaques on upper and lower limbs   Can affect palms and soles   Can affect anogenital mucosa   The plaques may be itchy or sore    How is discoid lupus erythematosus diagnosed? DLE is often diagnosed from its distribution in sun-exposed sites and the clinical appearance of the plaques  After a careful history, the patient with DLE should undergo a thorough general examination, to find out if other forms of lupus may be present  The diagnosis is usually confirmed by skin biopsy  Direct immunofluorescence is often positive in lesional skin in DLE (positive lupus band test)  The Cutaneous Lupus Erythematosus Disease Area and Severity Index (CLASI) was developed in an attempt to classify the severity of cutaneous LE  A score of activity and damage due to the disease is calculated in each of 12 anatomical locations (refer to the original published paper for details)  Blood tests: Patients with DLE will usually have blood tests at the time of diagnosis and from time to time afterwards   Full blood count   Renal function test   Inflammatory markers such as C-reactive protein (CRP)   Antinuclear antibody (ADIEL, ANF; if present, they are usually in low titre)   Extractable nuclear antibody (SOHA)   Anti-annexin 1 antibodies--these may be a diagnostic marker for discoid BUSTER    Circulating autoantibodies are found in about 50% of patients with DLE  What is the treatment for discoid lupus erythematosus? The following measures are important to reduce the chance of flares of DLE     Careful year-round protection from sun exposure using clothing, accessories and thickly applied SPF 50+ broad-spectrum sunscreens  Sunscreens alone are not adequate   Indoors, some patients may also need to stay away from glass windows, or these can be treated with UV-blocking films   Vitamin D supplements should be recommended for those who strictly avoid the sun   Smoking cessation  Topical therapy  Intermittent courses of potent topical corticosteroids are the main treatment for DLE  They should be applied accurately to the skin lesions for several weeks  Potency should be selected to suit the body site and thickness of the plaque  Very potent topical steroids may cause thinning of the surrounding skin and increase blood vessel formation (telangiectasia)  Intralesional injections of corticosteroids are sometimes used, especially for hypertrophic DLE  The calcineurin inhibitors tacrolimus ointment and pimecrolimus cream can also be used  Camouflage makeup may be used to improve cosmetic appearance  Systemic therapy  Typically, any of the following drugs may be used to treat DLE alone or in combination  Treatment is less effective in smokers than in non-smokers   Hydroxychloroquine and other antimalarials--response rates are about 80% in BUSTER   Systemic corticosteroids such as prednisone or prednisolone  These are rarely required for DLE   Methotrexate--best response in subacute BUSTER and discoid BUSTER   Retinoids isotretinoin and acitretin   Mycophenolate   Azathioprine   Dapsone   Thalidomide   Belimumab    DLE tends to persist for years or decades  In some patients, all signs of active disease resolve in time  About 25% of patients with discoid lupus erythematosus also develop systemic lupus erythematosus within months to decades of the diagnosis of skin disease  Discoid lupus erythematosus may leave permanent scars, even when the active disease has responded to treatment      Squamous cell carcinoma can rarely arise within a longstanding DLE plaque in the skin or mucous membrane  It presents as an enlarging warty growth or ulcer  It is usually treated surgically      Scribe Attestation    I,:  Luca Beaulieu am acting as a scribe while in the presence of the attending physician :       I,:  Ana Luisa Andrews MD personally performed the services described in this documentation    as scribed in my presence :

## 2022-08-01 DIAGNOSIS — E03.9 ACQUIRED HYPOTHYROIDISM: ICD-10-CM

## 2022-08-01 RX ORDER — LEVOTHYROXINE SODIUM 0.05 MG/1
TABLET ORAL
Qty: 90 TABLET | Refills: 1 | Status: SHIPPED | OUTPATIENT
Start: 2022-08-01

## 2022-09-20 ENCOUNTER — OFFICE VISIT (OUTPATIENT)
Dept: FAMILY MEDICINE CLINIC | Facility: CLINIC | Age: 47
End: 2022-09-20
Payer: COMMERCIAL

## 2022-09-20 VITALS
DIASTOLIC BLOOD PRESSURE: 70 MMHG | WEIGHT: 158 LBS | SYSTOLIC BLOOD PRESSURE: 110 MMHG | HEIGHT: 64 IN | OXYGEN SATURATION: 97 % | TEMPERATURE: 98.6 F | HEART RATE: 72 BPM | RESPIRATION RATE: 16 BRPM | BODY MASS INDEX: 26.98 KG/M2

## 2022-09-20 DIAGNOSIS — L93.0 DISCOID LUPUS: ICD-10-CM

## 2022-09-20 DIAGNOSIS — Z12.11 COLON CANCER SCREENING: ICD-10-CM

## 2022-09-20 DIAGNOSIS — Z12.31 BREAST CANCER SCREENING BY MAMMOGRAM: ICD-10-CM

## 2022-09-20 DIAGNOSIS — E03.9 ACQUIRED HYPOTHYROIDISM: Primary | ICD-10-CM

## 2022-09-20 DIAGNOSIS — Z00.00 ANNUAL PHYSICAL EXAM: ICD-10-CM

## 2022-09-20 PROBLEM — M35.9 CONNECTIVE TISSUE DISEASE (HCC): Status: RESOLVED | Noted: 2021-08-05 | Resolved: 2022-09-20

## 2022-09-20 PROBLEM — B34.9 VIRAL ILLNESS: Status: RESOLVED | Noted: 2022-01-25 | Resolved: 2022-09-20

## 2022-09-20 PROCEDURE — 3725F SCREEN DEPRESSION PERFORMED: CPT | Performed by: FAMILY MEDICINE

## 2022-09-20 PROCEDURE — 99396 PREV VISIT EST AGE 40-64: CPT | Performed by: FAMILY MEDICINE

## 2022-09-20 RX ORDER — FEXOFENADINE HCL 180 MG/1
180 TABLET ORAL
COMMUNITY

## 2022-09-20 NOTE — ASSESSMENT & PLAN NOTE
ADIEL neg last year will recheck
Check routine labs
Did  Not get follow up TSH as advised
all other ROS negative except as per HPI

## 2022-09-20 NOTE — PROGRESS NOTES
Name: Jeanie Salomon      : 1975      MRN: [id number]  Encounter Provider: Georgia Duuqe MD  Encounter Date: 2022   Encounter department: [address] [place] [address] MEDICINE    Assessment & Plan     1  Acquired hypothyroidism  Assessment & Plan:  Did  Not get follow up TSH as advised     Orders:  -     TSH, 3rd generation; Future    2  Discoid lupus  Assessment & Plan:  ADIEL neg last year will recheck    Orders:  -     Antinuclear Antibodies (ADIEL), IFA; Future    3  Annual physical exam  Assessment & Plan:  Check routine labs      Orders:  -     CBC and differential; Future  -     Comprehensive metabolic panel; Future; Expected date: 2022  -     Lipid panel; Future; Expected date: 2022  -     Urinalysis with microscopic    4  Breast cancer screening by mammogram  -     Mammo screening bilateral w 3d & cad; Future; Expected date: 2022    5  Colon cancer screening  -     Ambulatory referral for colonoscopy; Future           Subjective      HPI  Pt here for physical needs mammo colonoscopy and labs    Review of Systems   Constitutional: Negative for appetite change, chills, fatigue and fever  Respiratory: Negative for cough, chest tightness and shortness of breath  Cardiovascular: Negative for chest pain, palpitations and leg swelling  Gastrointestinal: Negative for abdominal pain, constipation, diarrhea, nausea and vomiting  Genitourinary: Negative for difficulty urinating and frequency  Musculoskeletal: Negative for arthralgias, back pain and neck pain  Skin: Negative for rash  Neurological: Negative for dizziness, weakness, light-headedness, numbness and headaches  Hematological: Does not bruise/bleed easily  Psychiatric/Behavioral: Negative for dysphoric mood and sleep disturbance  The patient is not nervous/anxious          Current Outpatient Medications on File Prior to Visit   Medication Sig    cetirizine (ZyrTEC) 10 mg tablet Take 10 mg by mouth daily  fexofenadine (ALLEGRA) 180 MG tablet Take 180 mg by mouth    levothyroxine 50 mcg tablet TAKE 1 TABLET BY MOUTH EVERY DAY    pimecrolimus (ELIDEL) 1 % cream Apply topically 2 (two) times a day    [DISCONTINUED] hydroxychloroquine (PLAQUENIL) 200 mg tablet Take 0 5 tablets (100 mg total) by mouth daily with breakfast       Objective     /70 (BP Location: Left arm, Patient Position: Sitting, Cuff Size: Standard)   Pulse 72   Temp 98 6 °F (37 °C) (Temporal)   Resp 16   Ht 5' 3 6" (1 615 m)   Wt 71 7 kg (158 lb)   SpO2 97%   BMI 27 46 kg/m²     Physical Exam  Vitals reviewed  Constitutional:       General: She is not in acute distress  Appearance: Normal appearance  She is well-developed  HENT:      Head: Normocephalic  Right Ear: Tympanic membrane, ear canal and external ear normal       Left Ear: Tympanic membrane, ear canal and external ear normal       Nose: Nose normal       Mouth/Throat:      Pharynx: No oropharyngeal exudate  Eyes:      General: Lids are normal       Extraocular Movements: Extraocular movements intact  Conjunctiva/sclera: Conjunctivae normal       Pupils: Pupils are equal, round, and reactive to light  Neck:      Thyroid: No thyromegaly  Vascular: No carotid bruit  Cardiovascular:      Rate and Rhythm: Normal rate and regular rhythm  Pulses: Normal pulses  Heart sounds: Normal heart sounds  No murmur heard  No friction rub  Pulmonary:      Effort: Pulmonary effort is normal  No respiratory distress  Breath sounds: Normal breath sounds  No stridor  No wheezing or rales  Chest:   Breasts: Breasts are symmetrical       Right: Normal  No swelling, bleeding, inverted nipple, mass, nipple discharge, skin change or tenderness  Left: Normal  No swelling, bleeding, inverted nipple, mass, nipple discharge, skin change or tenderness  Abdominal:      General: Bowel sounds are normal  There is no distension        Palpations: Abdomen is soft  There is no mass  Tenderness: There is no abdominal tenderness  There is no guarding  Hernia: No hernia is present  Musculoskeletal:         General: Normal range of motion  Cervical back: Full passive range of motion without pain, normal range of motion and neck supple  Lymphadenopathy:      Cervical: No cervical adenopathy  Skin:     General: Skin is warm and dry  Findings: No rash  Comments: Nl appearing moles   Neurological:      General: No focal deficit present  Mental Status: She is alert and oriented to person, place, and time  Mental status is at baseline  Cranial Nerves: No cranial nerve deficit  Sensory: No sensory deficit  Motor: No abnormal muscle tone  Coordination: Coordination normal       Gait: Gait normal       Deep Tendon Reflexes: Reflexes normal  Babinski sign absent on the right side  Psychiatric:         Mood and Affect: Mood normal          Speech: Speech normal          Behavior: Behavior normal          Thought Content:  Thought content normal          Judgment: Judgment normal        Cooper Thomas MD

## 2022-11-22 ENCOUNTER — APPOINTMENT (OUTPATIENT)
Dept: LAB | Facility: CLINIC | Age: 47
End: 2022-11-22

## 2022-11-22 DIAGNOSIS — R73.09 ELEVATED GLUCOSE: ICD-10-CM

## 2022-11-22 DIAGNOSIS — L93.0 DISCOID LUPUS: ICD-10-CM

## 2022-11-22 DIAGNOSIS — Z00.00 ANNUAL PHYSICAL EXAM: ICD-10-CM

## 2022-11-22 DIAGNOSIS — E03.9 ACQUIRED HYPOTHYROIDISM: ICD-10-CM

## 2022-11-22 LAB
ALBUMIN SERPL BCP-MCNC: 3.6 G/DL (ref 3.5–5)
ALP SERPL-CCNC: 76 U/L (ref 46–116)
ALT SERPL W P-5'-P-CCNC: 34 U/L (ref 12–78)
ANION GAP SERPL CALCULATED.3IONS-SCNC: 5 MMOL/L (ref 4–13)
AST SERPL W P-5'-P-CCNC: 27 U/L (ref 5–45)
BACTERIA UR QL AUTO: ABNORMAL /HPF
BASOPHILS # BLD AUTO: 0.07 THOUSANDS/ÂΜL (ref 0–0.1)
BASOPHILS NFR BLD AUTO: 1 % (ref 0–1)
BILIRUB SERPL-MCNC: 0.7 MG/DL (ref 0.2–1)
BILIRUB UR QL STRIP: NEGATIVE
BUN SERPL-MCNC: 10 MG/DL (ref 5–25)
CALCIUM SERPL-MCNC: 9.7 MG/DL (ref 8.3–10.1)
CHLORIDE SERPL-SCNC: 106 MMOL/L (ref 96–108)
CHOLEST SERPL-MCNC: 273 MG/DL
CLARITY UR: ABNORMAL
CO2 SERPL-SCNC: 27 MMOL/L (ref 21–32)
COLOR UR: YELLOW
CREAT SERPL-MCNC: 0.94 MG/DL (ref 0.6–1.3)
EOSINOPHIL # BLD AUTO: 0.12 THOUSAND/ÂΜL (ref 0–0.61)
EOSINOPHIL NFR BLD AUTO: 2 % (ref 0–6)
ERYTHROCYTE [DISTWIDTH] IN BLOOD BY AUTOMATED COUNT: 15.2 % (ref 11.6–15.1)
GFR SERPL CREATININE-BSD FRML MDRD: 71 ML/MIN/1.73SQ M
GLUCOSE P FAST SERPL-MCNC: 108 MG/DL (ref 65–99)
GLUCOSE UR STRIP-MCNC: NEGATIVE MG/DL
GRAN CASTS #/AREA URNS LPF: ABNORMAL /[LPF]
HCT VFR BLD AUTO: 42 % (ref 34.8–46.1)
HDLC SERPL-MCNC: 46 MG/DL
HGB BLD-MCNC: 13.2 G/DL (ref 11.5–15.4)
HGB UR QL STRIP.AUTO: NEGATIVE
HYALINE CASTS #/AREA URNS LPF: ABNORMAL /LPF
IMM GRANULOCYTES # BLD AUTO: 0.01 THOUSAND/UL (ref 0–0.2)
IMM GRANULOCYTES NFR BLD AUTO: 0 % (ref 0–2)
KETONES UR STRIP-MCNC: NEGATIVE MG/DL
LDLC SERPL CALC-MCNC: 196 MG/DL (ref 0–100)
LEUKOCYTE ESTERASE UR QL STRIP: ABNORMAL
LYMPHOCYTES # BLD AUTO: 2.92 THOUSANDS/ÂΜL (ref 0.6–4.47)
LYMPHOCYTES NFR BLD AUTO: 42 % (ref 14–44)
MCH RBC QN AUTO: 26.6 PG (ref 26.8–34.3)
MCHC RBC AUTO-ENTMCNC: 31.4 G/DL (ref 31.4–37.4)
MCV RBC AUTO: 85 FL (ref 82–98)
MONOCYTES # BLD AUTO: 0.65 THOUSAND/ÂΜL (ref 0.17–1.22)
MONOCYTES NFR BLD AUTO: 9 % (ref 4–12)
MUCOUS THREADS UR QL AUTO: ABNORMAL
NEUTROPHILS # BLD AUTO: 3.19 THOUSANDS/ÂΜL (ref 1.85–7.62)
NEUTS SEG NFR BLD AUTO: 46 % (ref 43–75)
NITRITE UR QL STRIP: NEGATIVE
NON-SQ EPI CELLS URNS QL MICRO: ABNORMAL /HPF
NONHDLC SERPL-MCNC: 227 MG/DL
NRBC BLD AUTO-RTO: 0 /100 WBCS
PH UR STRIP.AUTO: 6 [PH]
PLATELET # BLD AUTO: 205 THOUSANDS/UL (ref 149–390)
PMV BLD AUTO: 11.9 FL (ref 8.9–12.7)
POTASSIUM SERPL-SCNC: 4.2 MMOL/L (ref 3.5–5.3)
PROT SERPL-MCNC: 8.4 G/DL (ref 6.4–8.4)
PROT UR STRIP-MCNC: NEGATIVE MG/DL
RBC # BLD AUTO: 4.97 MILLION/UL (ref 3.81–5.12)
RBC #/AREA URNS AUTO: ABNORMAL /HPF
SODIUM SERPL-SCNC: 138 MMOL/L (ref 135–147)
SP GR UR STRIP.AUTO: 1.02 (ref 1–1.03)
TRIGL SERPL-MCNC: 156 MG/DL
TSH SERPL DL<=0.05 MIU/L-ACNC: 3.83 UIU/ML (ref 0.45–4.5)
UROBILINOGEN UR STRIP-ACNC: <2 MG/DL
WBC # BLD AUTO: 6.96 THOUSAND/UL (ref 4.31–10.16)
WBC #/AREA URNS AUTO: ABNORMAL /HPF

## 2022-11-23 DIAGNOSIS — R73.09 ELEVATED GLUCOSE: Primary | ICD-10-CM

## 2022-11-23 DIAGNOSIS — E78.00 ELEVATED CHOLESTEROL: ICD-10-CM

## 2022-11-23 DIAGNOSIS — R82.90 ABNORMAL URINE: ICD-10-CM

## 2022-11-23 LAB
ANA TITR SER IF: NEGATIVE {TITER}
EST. AVERAGE GLUCOSE BLD GHB EST-MCNC: 111 MG/DL
HBA1C MFR BLD: 5.5 %

## 2022-11-29 ENCOUNTER — APPOINTMENT (OUTPATIENT)
Dept: LAB | Facility: CLINIC | Age: 47
End: 2022-11-29

## 2022-11-29 DIAGNOSIS — R82.90 ABNORMAL URINE: ICD-10-CM

## 2022-12-01 DIAGNOSIS — N39.0 URINARY TRACT INFECTION WITHOUT HEMATURIA, SITE UNSPECIFIED: Primary | ICD-10-CM

## 2022-12-01 LAB
BACTERIA UR CULT: ABNORMAL
BACTERIA UR CULT: ABNORMAL

## 2022-12-01 RX ORDER — CEPHALEXIN 500 MG/1
500 CAPSULE ORAL EVERY 6 HOURS SCHEDULED
Qty: 21 CAPSULE | Refills: 0 | Status: SHIPPED | OUTPATIENT
Start: 2022-12-01 | End: 2022-12-08

## 2022-12-01 RX ORDER — CEPHALEXIN 500 MG/1
500 CAPSULE ORAL EVERY 6 HOURS SCHEDULED
COMMUNITY
End: 2022-12-01 | Stop reason: SDUPTHER

## 2022-12-20 ENCOUNTER — HOSPITAL ENCOUNTER (OUTPATIENT)
Dept: MAMMOGRAPHY | Facility: HOSPITAL | Age: 47
Discharge: HOME | End: 2022-12-20
Attending: FAMILY MEDICINE

## 2022-12-20 VITALS — HEIGHT: 64 IN | BODY MASS INDEX: 27.55 KG/M2

## 2022-12-20 DIAGNOSIS — Z12.31 BREAST CANCER SCREENING BY MAMMOGRAM: ICD-10-CM

## 2023-01-19 DIAGNOSIS — E03.9 ACQUIRED HYPOTHYROIDISM: ICD-10-CM

## 2023-01-19 RX ORDER — LEVOTHYROXINE SODIUM 0.05 MG/1
50 TABLET ORAL DAILY
Qty: 90 TABLET | Refills: 0 | Status: SHIPPED | OUTPATIENT
Start: 2023-01-19

## 2023-01-20 ENCOUNTER — TELEPHONE (OUTPATIENT)
Dept: FAMILY MEDICINE CLINIC | Facility: CLINIC | Age: 48
End: 2023-01-20

## 2023-01-20 DIAGNOSIS — N39.0 URINARY TRACT INFECTION WITHOUT HEMATURIA, SITE UNSPECIFIED: Primary | ICD-10-CM

## 2023-01-20 NOTE — TELEPHONE ENCOUNTER
Yajaira Diaz called says she feels she has the same UTI she has on 11/29  She's asking if you can put in an order for her to get a urine culture so she can get it done at a lab or in the office      S/S: Back and pelvic pain    Yajaira Diaz # [phone]    [facility] [address]

## 2023-01-27 ENCOUNTER — APPOINTMENT (OUTPATIENT)
Dept: LAB | Facility: CLINIC | Age: 48
End: 2023-01-27

## 2023-01-27 DIAGNOSIS — E78.00 ELEVATED CHOLESTEROL: ICD-10-CM

## 2023-01-27 DIAGNOSIS — N39.0 URINARY TRACT INFECTION WITHOUT HEMATURIA, SITE UNSPECIFIED: ICD-10-CM

## 2023-01-27 LAB
BACTERIA UR QL AUTO: ABNORMAL /HPF
BILIRUB UR QL STRIP: NEGATIVE
CHOLEST SERPL-MCNC: 300 MG/DL
CLARITY UR: CLEAR
COLOR UR: YELLOW
GLUCOSE UR STRIP-MCNC: NEGATIVE MG/DL
HDLC SERPL-MCNC: 40 MG/DL
HGB UR QL STRIP.AUTO: NEGATIVE
HYALINE CASTS #/AREA URNS LPF: ABNORMAL /LPF
KETONES UR STRIP-MCNC: NEGATIVE MG/DL
LDLC SERPL CALC-MCNC: 205 MG/DL (ref 0–100)
LEUKOCYTE ESTERASE UR QL STRIP: ABNORMAL
NITRITE UR QL STRIP: NEGATIVE
NON-SQ EPI CELLS URNS QL MICRO: ABNORMAL /HPF
NONHDLC SERPL-MCNC: 260 MG/DL
PH UR STRIP.AUTO: 6 [PH]
PROT UR STRIP-MCNC: NEGATIVE MG/DL
RBC #/AREA URNS AUTO: ABNORMAL /HPF
SP GR UR STRIP.AUTO: 1.01 (ref 1–1.03)
TRIGL SERPL-MCNC: 273 MG/DL
UROBILINOGEN UR STRIP-ACNC: <2 MG/DL
WBC #/AREA URNS AUTO: ABNORMAL /HPF

## 2024-03-28 DIAGNOSIS — E03.9 ACQUIRED HYPOTHYROIDISM: ICD-10-CM

## 2024-03-28 RX ORDER — LEVOTHYROXINE SODIUM 0.05 MG/1
TABLET ORAL
Qty: 90 TABLET | Refills: 3 | Status: SHIPPED | OUTPATIENT
Start: 2024-03-28

## 2025-01-22 PROBLEM — R21 RASH: Status: RESOLVED | Noted: 2021-06-28 | Resolved: 2025-01-22

## 2025-02-20 PROBLEM — J30.2 SEASONAL ALLERGIES: Status: ACTIVE | Noted: 2025-02-20

## 2025-02-20 NOTE — PROGRESS NOTES
Name: Alejandra Norman      : 1975      MRN: [id number]  Encounter Provider: Nadiya Hairston MD  Encounter Date: 2025   Encounter department: [facility]  :  Assessment & Plan  Annual physical exam  Check routine labs         Acquired hypothyroidism  Check TSH       Seasonal allergies  Cont allergy meds otc prn       Encounter for immunization                History of Present Illness   Patient here for annual physical          Review of Systems   Constitutional:  Negative for appetite change, chills, fatigue and fever.   Respiratory:  Negative for cough, chest tightness and shortness of breath.    Cardiovascular:  Negative for chest pain, palpitations and leg swelling.   Gastrointestinal:  Negative for abdominal pain, constipation, diarrhea, nausea and vomiting.   Genitourinary:  Negative for difficulty urinating and frequency.   Musculoskeletal:  Negative for arthralgias, back pain, gait problem and neck pain.   Skin:  Negative for rash.   Neurological:  Negative for dizziness, weakness, light-headedness, numbness and headaches.   Hematological:  Does not bruise/bleed easily.   Psychiatric/Behavioral:  Negative for dysphoric mood and sleep disturbance. The patient is not nervous/anxious.        Objective   There were no vitals taken for this visit.     Physical Exam  Vitals and nursing note reviewed.   Constitutional:       General: She is not in acute distress.     Appearance: Normal appearance. She is well-developed and normal weight.   HENT:      Head: Normocephalic and atraumatic.      Right Ear: Tympanic membrane, ear canal and external ear normal.      Left Ear: Tympanic membrane, ear canal and external ear normal.      Nose: Nose normal.      Mouth/Throat:      Mouth: Mucous membranes are moist.      Pharynx: Oropharynx is clear. No oropharyngeal exudate or posterior oropharyngeal erythema.   Eyes:      Extraocular Movements: Extraocular movements intact.       Conjunctiva/sclera: Conjunctivae normal.      Pupils: Pupils are equal, round, and reactive to light.   Neck:      Thyroid: No thyromegaly.      Vascular: No carotid bruit.   Cardiovascular:      Rate and Rhythm: Normal rate and regular rhythm.      Heart sounds: Normal heart sounds. No murmur heard.  Pulmonary:      Effort: Pulmonary effort is normal. No respiratory distress.      Breath sounds: Normal breath sounds. No wheezing, rhonchi or rales.   Chest:   Breasts:     Right: Normal. No swelling, bleeding, inverted nipple, mass, nipple discharge, skin change or tenderness.      Left: Normal. No swelling, bleeding, inverted nipple, mass, nipple discharge, skin change or tenderness.   Abdominal:      General: Bowel sounds are normal. There is no distension.      Palpations: Abdomen is soft. There is no mass.      Tenderness: There is no abdominal tenderness. There is no right CVA tenderness, left CVA tenderness, guarding or rebound.      Hernia: No hernia is present.   Musculoskeletal:         General: No swelling or tenderness. Normal range of motion.      Cervical back: Normal range of motion and neck supple.      Right lower leg: No edema.      Left lower leg: No edema.   Skin:     General: Skin is warm and dry.      Capillary Refill: Capillary refill takes less than 2 seconds.      Findings: No rash.   Neurological:      General: No focal deficit present.      Mental Status: She is alert and oriented to person, place, and time. Mental status is at baseline.      Cranial Nerves: No cranial nerve deficit.      Sensory: No sensory deficit.      Motor: No weakness.      Coordination: Coordination normal.      Gait: Gait normal.      Deep Tendon Reflexes: Reflexes normal.   Psychiatric:         Mood and Affect: Mood normal.         Behavior: Behavior normal.

## 2025-02-25 ENCOUNTER — OFFICE VISIT (OUTPATIENT)
Dept: FAMILY MEDICINE CLINIC | Facility: CLINIC | Age: 50
End: 2025-02-25
Payer: COMMERCIAL

## 2025-02-25 VITALS
SYSTOLIC BLOOD PRESSURE: 124 MMHG | OXYGEN SATURATION: 99 % | TEMPERATURE: 98 F | HEIGHT: 64 IN | DIASTOLIC BLOOD PRESSURE: 82 MMHG | HEART RATE: 89 BPM | WEIGHT: 165 LBS | RESPIRATION RATE: 16 BRPM | BODY MASS INDEX: 28.17 KG/M2

## 2025-02-25 DIAGNOSIS — Z12.11 COLON CANCER SCREENING: ICD-10-CM

## 2025-02-25 DIAGNOSIS — Z00.00 ANNUAL PHYSICAL EXAM: Primary | ICD-10-CM

## 2025-02-25 DIAGNOSIS — J30.2 SEASONAL ALLERGIES: ICD-10-CM

## 2025-02-25 DIAGNOSIS — Z12.31 ENCOUNTER FOR SCREENING MAMMOGRAM FOR BREAST CANCER: ICD-10-CM

## 2025-02-25 DIAGNOSIS — Z23 ENCOUNTER FOR IMMUNIZATION: ICD-10-CM

## 2025-02-25 DIAGNOSIS — E03.9 ACQUIRED HYPOTHYROIDISM: ICD-10-CM

## 2025-02-25 PROCEDURE — 99396 PREV VISIT EST AGE 40-64: CPT | Performed by: FAMILY MEDICINE

## 2025-03-30 DIAGNOSIS — E03.9 ACQUIRED HYPOTHYROIDISM: ICD-10-CM

## 2025-04-01 RX ORDER — LEVOTHYROXINE SODIUM 50 UG/1
50 TABLET ORAL DAILY
Qty: 90 TABLET | Refills: 0 | Status: SHIPPED | OUTPATIENT
Start: 2025-04-01

## 2025-04-15 NOTE — PROGRESS NOTES
"Initial /79 (BP Location: Right arm, Patient Position: Sitting, Cuff Size: Adult Regular)   Pulse 72   Temp 98  F (36.7  C) (Tympanic)   Ht 1.65 m (5' 4.96\")   Wt 100.7 kg (222 lb)   BMI 36.99 kg/m   Estimated body mass index is 36.99 kg/m  as calculated from the following:    Height as of this encounter: 1.65 m (5' 4.96\").    Weight as of this encounter: 100.7 kg (222 lb). .  Danni Brock MA    " Consultation with Dr Lockett Must:  Biopsy suggestive of CT disease, increased mucin noted  Instructed [place] speaking assistants to call    Patient to have ADIEL, ds DNA, CBC, CMP and U/A done and then follow up in office to discuss results and treatment

## 2025-07-15 DIAGNOSIS — E03.9 ACQUIRED HYPOTHYROIDISM: ICD-10-CM

## 2025-07-16 RX ORDER — LEVOTHYROXINE SODIUM 50 UG/1
50 TABLET ORAL DAILY
Qty: 30 TABLET | Refills: 0 | Status: SHIPPED | OUTPATIENT
Start: 2025-07-16